# Patient Record
Sex: FEMALE | URBAN - METROPOLITAN AREA
[De-identification: names, ages, dates, MRNs, and addresses within clinical notes are randomized per-mention and may not be internally consistent; named-entity substitution may affect disease eponyms.]

---

## 2017-07-12 PROCEDURE — 00000346 ZZHCL STATISTIC REVIEW OUTSIDE SLIDES TC 88321: Performed by: OBSTETRICS & GYNECOLOGY

## 2017-07-13 LAB — COPATH REPORT: NORMAL

## 2017-08-10 ENCOUNTER — ONCOLOGY VISIT (OUTPATIENT)
Dept: ONCOLOGY | Facility: CLINIC | Age: 72
End: 2017-08-10
Attending: OBSTETRICS & GYNECOLOGY
Payer: MEDICARE

## 2017-08-10 VITALS
OXYGEN SATURATION: 96 % | WEIGHT: 270 LBS | DIASTOLIC BLOOD PRESSURE: 79 MMHG | HEART RATE: 77 BPM | SYSTOLIC BLOOD PRESSURE: 111 MMHG | HEIGHT: 65 IN | TEMPERATURE: 98.1 F | BODY MASS INDEX: 44.98 KG/M2

## 2017-08-10 DIAGNOSIS — B37.2 CANDIDIASIS OF SKIN: ICD-10-CM

## 2017-08-10 DIAGNOSIS — C54.1 PAPILLARY SEROUS ADENOCARCINOMA OF ENDOMETRIUM (H): Primary | ICD-10-CM

## 2017-08-10 PROCEDURE — 99203 OFFICE O/P NEW LOW 30 MIN: CPT | Mod: ZP | Performed by: OBSTETRICS & GYNECOLOGY

## 2017-08-10 PROCEDURE — 57100 BIOPSY VAGINAL MUCOSA SIMPLE: CPT | Mod: ZF | Performed by: OBSTETRICS & GYNECOLOGY

## 2017-08-10 PROCEDURE — 99213 OFFICE O/P EST LOW 20 MIN: CPT | Mod: ZF,25

## 2017-08-10 PROCEDURE — 88305 TISSUE EXAM BY PATHOLOGIST: CPT | Performed by: OBSTETRICS & GYNECOLOGY

## 2017-08-10 RX ORDER — WARFARIN SODIUM 5 MG/1
5 TABLET ORAL DAILY
COMMUNITY
Start: 2015-09-01

## 2017-08-10 RX ORDER — NYSTATIN 100000 U/G
CREAM TOPICAL 3 TIMES DAILY
Qty: 15 G | Refills: 1 | Status: SHIPPED | OUTPATIENT
Start: 2017-08-10 | End: 2017-08-24

## 2017-08-10 RX ORDER — OXYBUTYNIN CHLORIDE 10 MG/1
10 TABLET, EXTENDED RELEASE ORAL DAILY
COMMUNITY
Start: 2015-09-01

## 2017-08-10 RX ORDER — MONTELUKAST SODIUM 10 MG/1
10 TABLET ORAL AT BEDTIME
COMMUNITY

## 2017-08-10 RX ORDER — ZOLPIDEM TARTRATE 5 MG/1
5 TABLET ORAL
COMMUNITY
Start: 2015-09-01

## 2017-08-10 RX ORDER — ESCITALOPRAM OXALATE 20 MG/1
20 TABLET ORAL DAILY
COMMUNITY

## 2017-08-10 RX ORDER — DEXLANSOPRAZOLE 30 MG/1
60 CAPSULE, DELAYED RELEASE ORAL DAILY
COMMUNITY

## 2017-08-10 RX ORDER — METOPROLOL TARTRATE 25 MG/1
25 TABLET, FILM COATED ORAL 2 TIMES DAILY
COMMUNITY
Start: 2015-09-01

## 2017-08-10 RX ORDER — ATORVASTATIN CALCIUM 20 MG/1
20 TABLET, FILM COATED ORAL DAILY
COMMUNITY
Start: 2015-09-01

## 2017-08-10 RX ORDER — IBUPROFEN 800 MG/1
800 TABLET, FILM COATED ORAL 3 TIMES DAILY PRN
COMMUNITY
Start: 2015-09-01

## 2017-08-10 RX ORDER — ALENDRONATE SODIUM 70 MG/1
70 TABLET ORAL WEEKLY
COMMUNITY

## 2017-08-10 RX ORDER — TIZANIDINE HYDROCHLORIDE 2 MG/1
2 CAPSULE, GELATIN COATED ORAL EVERY 6 HOURS PRN
COMMUNITY
Start: 2015-10-26

## 2017-08-10 ASSESSMENT — PAIN SCALES - GENERAL: PAINLEVEL: NO PAIN (0)

## 2017-08-10 NOTE — LETTER
8/10/2017       RE: Yashira Son  [address]  [facility] [address]     Dear Colleague,    Thank you for referring your patient, Yashira Son, to the [facility]. Please see a copy of my visit note below.                            Consult Notes on Referred Patient    Date: 2017       Dr. Debra Wolfe, NP   [facility]  [address]  [place] [address]       RE: Yashira Son  : 1945  WAGNER: 8/10/2017    Dear Dr. Debra Wolfe:    I had the pleasure of seeing your patient Yashira Son here at the Gynecologic Cancer Clinic at the [facility] on 8/10/2017.  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  Given these findings she was subsequently sent to the Gynecologic Cancer Clinic for new patient consultation.     HPI:  Ms. Son is a 72 year old woman who originally presented with post-menopausal bleeding in 2016 with atypical glandular cell pap in 2015. Biopsy revealed adenocarcinoma and the patient proceeded with subsequent robotic hysterectomy, BSO and cancer staging on 12/18/15. No malignancy seen at time of procedure and peritoneal surfaces and omentum were clear. Final pathology report showed positive washings, a 2.1 cm noninvasive high-grade serous endometrial cancer, and benign polyps. + fibroids. DNA mismatch repair intact showing this is not due to hoyos. ER receptor was 66% positive and IA receptor was 38% positive --> stage IA high-grade serous endometrial cancer with positive washings. Patient underwent 6 cycles of chemotherapy, completed 16. Dose reduction of Taxol done during cycle #4 due to neuropathy of fingers and toes. CT scan was negative on 16. Other notable history includes: atrial fibrillation on coumadin, morbid obesity, adult-onset diabetes, asthma and hyperlipidemia.     12/18/15: [facility]  [facility]/BSO/bilateral PPALND/omental biopsy with Dr. Brunner for high grade serous endometrial cancer.    PATH REREAD:A. UTERUS, FALLOPIAN TUBES AND OVARIES, HYSTERECTOMY AND BILATERAL   SALPINGO-OOPHORECTOMY:   - Endometrial serous carcinoma   - Maximum tumor size 2.1cm   - Myometrial invasion absent   - Lower uterine segment, cervix and uterine serosa - uninvolved   - Lymphovascular invasion - absent   - Margins negative for tumor   - Ovary and fallopian tubes negative for carcinoma.   - Leiomyomas     B. LYMPH NODES, RIGHT PERIAORTIC, DISSECTION:   - Two lymph nodes negative for carcinoma (0/2)     C. LYMPH NODE, LEFT PERIAORTIC, DISSECTION:   - One lymph nodes negative for carcinoma (0/1)     D. LYMPH NODES, LEFT PELVIC, DISSECTION:   - Two lymph nodes negative for carcinoma (0/2)     E. LYMPH NODES, RIGHT PARA-AORTIC DISSECTION:   - Nine lymph nodes negative for carcinoma (0/9)     F. OMENTUM, OMENTECTOMY:   - Adipose tissue negative for carcinoma     COMMENT:   Tumor cells are focally positive for estrogen receptor (5%) and negative   for progesterone receptor.   DNA mismatch repair enzymes are intact. Immunostains performed in the   outside institution are reviewed in-house.     CT C/A/P at OSH (6/2016): negative for masses except stable left lung nodule    Today, she presents for transfer of care.  She has not been seen since 6/2016, she did not know she needed to be seen more frequently than annually.  She notes feeling well.  Denies vaginal bleeding.  Eating healthy diet, denies nausea and vomiting.  Has been trying to lose weight because she gained weight during chemotherapy.  She denies fevers, chills, chest pain, SOB, abdominal pain, diarrhea, constipation, urinary symptoms.  She does note ongoing grief around her cancer diagnosis as well several losses of family members around the same time including one of her daughters and two grandchildren.    Review of Systems:    Systemic           no unintentional  weight changes; no fever; no chills; no night sweats; no appetite changes  Skin           no rashes, or lesions  Eye           no irritation; no changes in vision  [place]-Laryngeal           no dysphagia; no hoarseness   Pulmonary    no cough; no shortness of breath  Cardiovascular    no chest pain; no palpitations  Gastrointestinal    no diarrhea; no constipation; no abdominal pain; no changes in bowel  habits; no blood in stool  Genitourinary   no urinary frequency; no urinary urgency; no dysuria; no pain; no abnormal vaginal discharge; no abnormal vaginal bleeding  Breast   no breast discharge; no breast changes; no breast pain  Musculoskeletal    no myalgias; no arthralgias; no back pain  Psychiatric           + grief, no depressed mood; no anxiety    Hematologic           no tender lymph nodes; no noticeable swellings or lumps   Endocrine    no hot flashes; no heat/cold intolerance         Neurological   no tremor; no numbness and tingling; no headaches; no difficulty  sleeping    I have reviewed and addressed the patient's review of symptoms for today's visit.     Past Medical History:    Past Medical History:   Diagnosis Date     Anxiety      Asthma      Atrial fibrillation (H)      Depression      Endometrial cancer (H)      Hyperlipidemia      RONNY (obstructive sleep apnea)      Prediabetes          Past Surgical History:    Past Surgical History:   Procedure Laterality Date     DAVINCI HYSTERECTOMY TOTAL, BILATERAL SALPINGO-OOPHORECTOMY, NODE DISSECTION, COMBINED       oophorectomy Right     XL, dermoid, in 1981     THYROIDECTOMY      subtotal         Health Maintenance:  Health Maintenance Due   Topic Date Due     TETANUS IMMUNIZATION (SYSTEM ASSIGNED)  07/23/1961     LIPID SCREEN Q5 YR FEMALE (SYSTEM ASSIGNED)  07/23/1988     MAMMO SCREEN Q2 YR (SYSTEM ASSIGNED)  07/23/1993     COLON CANCER SCREEN (SYSTEM ASSIGNED)  07/23/1993     ADVANCE DIRECTIVE PLANNING Q5 YRS  07/23/1998     FALL RISK ASSESSMENT   "07/23/2008     DEXA SCAN SCREENING (SYSTEM ASSIGNED)  07/23/2008     PNEUMOCOCCAL (1 of 2 - PCV13) 07/23/2008       Last Pap Smear: 2015              Result: abnormal: glandular cell pap   She has not had a history of abnormal Pap smears prior to this.     Last Mammogram: 2013              Result: normal      She has not had a history of abnormal mammograms.    Last Colonoscopy: 2013              Result: normal                      Current Medications:     has a current medication list which includes the following prescription(s): alendronate, dexlansoprazole, metformin, atorvastatin, fluticasone-salmeterol, ibuprofen, oxybutynin, metoprolol, warfarin, tizanidine hcl, escitalopram, zolpidem, montelukast, prochlorperazine maleate, cholecalciferol, and b complex-biotin-fa.       Allergies:    No Known Allergies       Social History:     Social History   Substance Use Topics     Smoking status: Former Smoker     Smokeless tobacco: Never Used      Comment: quite 40+ years ago      Alcohol use No      Comment: socially       History   Drug Use No           Family History:   No known family history, but most is unknown to her.     Family History   Problem Relation Age of Onset     Adopted: Yes         Physical Exam:     /79  Pulse 77  Temp 98.1  F (36.7  C) (Oral)  Ht 1.651 m (5' 5\")  Wt 122.5 kg (270 lb)  SpO2 96%  BMI 44.93 kg/m2  Body mass index is 44.93 kg/(m^2).    General Appearance: healthy and alert, no distress     HEENT:  no thyromegaly, no palpable nodules or masses        Cardiovascular: irregular rate and rhythm, no gallops, rubs or murmurs     Respiratory: lungs clear, no rales, rhonchi or wheezes, normal diaphragmatic excursion    Musculoskeletal: extremities non tender and without edema    Skin: no lesions or rashes     Neurological: normal gait, no gross defects     Psychiatric: appropriate mood and affect                               Hematological: normal cervical, supraclavicular and " inguinal lymph nodes     Gastrointestinal:       abdomen obese, soft, non-tender, non-distended, no organomegaly or masses, pannus with small erythematous area consistent with yeast approximately 4 x 2cm    Genitourinary: External genitalia and urethral meatus appears normal.  1.5 cm nodule consistent with lipoma noted on left gluteal area. Vagina is smooth without nodularity or masses.  Vaginal cuff appears normal and without lesions except for 0.5 cm fleshy appearing lesion, possible granulation tissue on cuff that was removed with biopsy forceps.  Bimanual exam reveal no masses, nodularity or fullness.  Recto-vaginal exam confirms these findings.      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 Recommend CT C/A/P.  Discussed recommendations for Q3 month visits for the first two years followed by Q6 months visit for the subsequent three years.  No concerning findings on exam except for vaginal cuff lesion, appearance consistent with polyp but will follow-up on pathology.       2.) Genetic risk factors were assessed and the patient does not meet the qualifications for a referral.      3.) Labs and/or tests ordered include:  CT C/A/P for surveillance      4.) Health maintenance issues addressed today include yearly mammograms and continued follow-up with PCP for management of medical co-morbidities.  Follow-up with counselor and PCP regarding mental health. Offered referral to clinic  per patient preference.     5.)  Pannus candidiasis: discussed hygiene to area, rx provided for nystatin cream.       Thank you for allowing us to participate in the care of your patient.         Sincerely,  Enriqueta Chairez MD    Department of Ob/Gyn and Women's Health  Division of Gynecologic Oncology  [facility]  [facility]  [phone]    I saw and evaluated the patient with the resident.  I edited and reviewed the above note.   The resident acted as a scribe for this visit.  Of a 35 minute appointment, more than 50% was spent in counseling the patient.    Addendum:  SPECIMEN(S):   Vaginal cuff biopsy     FINAL DIAGNOSIS:   VAGINA, VAGINAL CUFF BIOPSY:   - Granulation tissue   - Negative for malignancy   CC  Patient Care Team:  Debra Wolfe NP as PCP - General (Nurse Practitioner)  DEBRA WOLFE, Dali Fowler MD PGY-3 acted as a scribe for Dr. Chairez for today's visit.       No evidence of cancer on biopsy.  Please inform patient of the laboratory results.     Again, thank you for allowing me to participate in the care of your patient.      Sincerely,    Enriqueta Chairez MD

## 2017-08-10 NOTE — MR AVS SNAPSHOT
"              After Visit Summary   8/10/2017    Yashira Son    MRN: [id number]           Patient Information     Date Of Birth          1945        Visit Information        Provider Department      8/10/2017 10:00 AM Enriqueta Chairez MD [facility]        Today's Diagnoses     Papillary serous adenocarcinoma of endometrium (H)    -  1    Candidiasis of skin           Follow-ups after your visit        Your next 10 appointments already scheduled     Nov 14, 2017  4:00 PM CST   (Arrive by 3:45 PM)   Return Visit with NATHAN James CNP   [facility]    [address]  [address]  [facility] [address] [phone]              Who to contact     If you have questions or need follow up information about today's clinic visit or your schedule please contact [facility] directly at [phone].  Normal or non-critical lab and imaging results will be communicated to you by MyChart, letter or phone within 4 business days after the clinic has received the results. If you do not hear from us within 7 days, please contact the clinic through "[employer]"hart or phone. If you have a critical or abnormal lab result, we will notify you by phone as soon as possible.  Submit refill requests through [employer] or call your pharmacy and they will forward the refill request to us. Please allow 3 business days for your refill to be completed.          Additional Information About Your Visit        "[employer] Information     [employer] lets you send messages to your doctor, view your test results, renew your prescriptions, schedule appointments and more. To sign up, go to [email or web address] . Click on \"Log in\" on the left side of the screen, which will take you to the Welcome page. Then click on \"Sign up Now\" on the right side of the page.     You will be asked to enter the access code listed below, as well as some " "personal information. Please follow the directions to create your username and password.     Your access code is: [email or web address]  Expires: 2017  9:31 AM     Your access code will  in 90 days. If you need help or a new code, please call your [facility] clinic or [phone].        Care EveryWhere ID     This is your Care EveryWhere ID. This could be used by other organizations to access your [facility] medical records  [id number]        Your Vitals Were     Pulse Temperature Height Pulse Oximetry BMI (Body Mass Index)       77 98.1  F (36.7  C) (Oral) 1.651 m (5' 5\") 96% 44.93 kg/m2        Blood Pressure from Last 3 Encounters:   08/10/17 111/79    Weight from Last 3 Encounters:   08/10/17 122.5 kg (270 lb)              We Performed the Following     Biopsy of Vagina     Surgical pathology exam          Today's Medication Changes          These changes are accurate as of: 8/10/17 11:59 PM.  If you have any questions, ask your nurse or doctor.               Start taking these medicines.        Dose/Directions    iohexol 140 MG/ML Soln solution   Commonly known as:  OMNIPAQUE   Used for:  Papillary serous adenocarcinoma of endometrium (H)   Started by:  Enriqueta Chairez MD        Mix entire bottle (50ml) of contast with 600ml (20 ounces) of water and drink half 2 hrs prior to CT scan and half 1 hr prior to scan   Quantity:  140 mL   Refills:  0       nystatin cream   Commonly known as:  MYCOSTATIN   Used for:  Candidiasis of skin   Started by:  Enriqueta Chairez MD        Apply topically 3 times daily for 14 days   Quantity:  15 g   Refills:  1            Where to get your medicines      These medications were sent to [facility] [place], [place] - [address]  [address] [address]    Hours:  TRANSPLANT PHONE NUMBER [phone] Phone:  [phone]     iohexol 140 MG/ML Soln solution         These medications were sent to [facility] [address] AT [address] [place] [address] & [address] [address]     Phone:  [phone]     nystatin cream                Primary Care Provider Office Phone # Fax #    Debra Wolfe CRUIZTO [phone]        [facility] [address] [address]        Equal Access to Services     INGRID ESTRADA : Hadii aad ku hadasho Soomaali, waaxda luqadaha, qaybta kaalmada adeegyada, waxay idiin hayaan adeeg kharash la'cristaln ah. So [facility] [phone].    ATENCIÓN: Si habla espfan, tiene a pichardo disposición servicios gratuitos de asistencia lingüística. Ednaame al [phone].    We comply with applicable federal civil rights laws and [place] laws. We do not discriminate on the basis of race, color, national origin, age, disability sex, sexual orientation or gender identity.            Thank you!     Thank you for choosing [facility]  for your care. Our goal is always to provide you with excellent care. Hearing back from our patients is one way we can continue to improve our services. Please take a few minutes to complete the written survey that you may receive in the mail after your visit with us. Thank you!             Your Updated Medication List - Protect others around you: Learn how to safely use, store and throw away your medicines at www.disposemymeds.org.          This list is accurate as of: 8/10/17 11:59 PM.  Always use your most recent med list.                   Brand Name Dispense Instructions for use Diagnosis    ADVAIR DISKUS 250-50 MCG/DOSE diskus inhaler   Generic drug:  fluticasone-salmeterol      Inhale 1 puff into the lungs        alendronate 70 MG tablet    FOSAMAX     Take 70 mg by mouth once a week        atorvastatin 20 MG tablet    LIPITOR     Take 20 mg by mouth        B-50 COMPLEX PO           calcium-magnesium 500-250 MG Tabs per tablet    CALMAG     Take 1 tablet by mouth 2 times daily  (with meals)        cholecalciferol 5000 UNITS Tabs tablet    vitamin D3     Take 5,000 Units by mouth daily        dexlansoprazole 30 MG Cpdr CR capsule    DEXILANT     Take 60 mg by mouth        escitalopram 20 MG tablet    LEXAPRO     Take 20 mg by mouth        ibuprofen 800 MG tablet    ADVIL/MOTRIN     Take 800 mg by mouth        iohexol 140 MG/ML Soln solution    OMNIPAQUE    140 mL    Mix entire bottle (50ml) of contast with 600ml (20 ounces) of water and drink half 2 hrs prior to CT scan and half 1 hr prior to scan    Papillary serous adenocarcinoma of endometrium (H)       metFORMIN 500 MG tablet    GLUCOPHAGE     Take 500 mg by mouth 3 times daily (with meals)        metoprolol 25 MG tablet    LOPRESSOR     Take 25 mg by mouth        montelukast 10 MG tablet    SINGULAIR     Take 10 mg by mouth At Bedtime        nystatin cream    MYCOSTATIN    15 g    Apply topically 3 times daily for 14 days    Candidiasis of skin       oxybutynin 10 MG 24 hr tablet    DITROPAN-XL     10 mg        PROCHLORPERAZINE MALEATE PO      Take 10 mg by mouth every 6 hours as needed for nausea or vomiting        TiZANidine HCl 2 MG Caps      Take 2 mg by mouth        warfarin 5 MG tablet    COUMADIN     Take 5 mg by mouth 2.5 mg (1/2 tablet) M-W-F, whole tablet on other days.        zolpidem 5 MG tablet    AMBIEN     Take 5 mg by mouth

## 2017-08-10 NOTE — PROGRESS NOTES
Consult Notes on Referred Patient    Date: 2017       Dr. Debra Wolfe, NP  [address] [place] [address]       RE: Yashira Son  : 1945  WAGNER: 8/10/2017    Dear Dr. Debra Wolfe:    I had the pleasure of seeing your patient Yashira Son here at the Gynecologic Cancer Clinic at the [facility] on 8/10/2017.  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  Given these findings she was subsequently sent to the Gynecologic Cancer Clinic for new patient consultation.     HPI:  Ms. Son is a 72 year old woman who originally presented with post-menopausal bleeding in 2016 with atypical glandular cell pap in 2015. Biopsy revealed adenocarcinoma and the patient proceeded with subsequent robotic hysterectomy, BSO and cancer staging on 12/18/15. No malignancy seen at time of procedure and peritoneal surfaces and omentum were clear. Final pathology report showed positive washings, a 2.1 cm noninvasive high-grade serous endometrial cancer, and benign polyps. + fibroids. DNA mismatch repair intact showing this is not due to hoyos. ER receptor was 66% positive and RI receptor was 38% positive --> stage IA high-grade serous endometrial cancer with positive washings. Patient underwent 6 cycles of chemotherapy, completed 16. Dose reduction of Taxol done during cycle #4 due to neuropathy of fingers and toes. CT scan was negative on 16. Other notable history includes: atrial fibrillation on coumadin, morbid obesity, adult-onset diabetes, asthma and hyperlipidemia.     12/18/15: [facility] TLH/BSO/bilateral PPALND/omental biopsy with Dr. Brunner for high grade serous endometrial cancer.    PATH REREAD:A. UTERUS, FALLOPIAN TUBES AND OVARIES, HYSTERECTOMY AND BILATERAL   SALPINGO-OOPHORECTOMY:   - Endometrial serous carcinoma   - Maximum tumor  size 2.1cm   - Myometrial invasion absent   - Lower uterine segment, cervix and uterine serosa - uninvolved   - Lymphovascular invasion - absent   - Margins negative for tumor   - Ovary and fallopian tubes negative for carcinoma.   - Leiomyomas     B. LYMPH NODES, RIGHT PERIAORTIC, DISSECTION:   - Two lymph nodes negative for carcinoma (0/2)     C. LYMPH NODE, LEFT PERIAORTIC, DISSECTION:   - One lymph nodes negative for carcinoma (0/1)     D. LYMPH NODES, LEFT PELVIC, DISSECTION:   - Two lymph nodes negative for carcinoma (0/2)     E. LYMPH NODES, RIGHT PARA-AORTIC DISSECTION:   - Nine lymph nodes negative for carcinoma (0/9)     F. OMENTUM, OMENTECTOMY:   - Adipose tissue negative for carcinoma     COMMENT:   Tumor cells are focally positive for estrogen receptor (5%) and negative   for progesterone receptor.   DNA mismatch repair enzymes are intact. Immunostains performed in the   outside institution are reviewed in-house.     CT C/A/P at OSH (6/2016): negative for masses except stable left lung nodule    Today, she presents for transfer of care.  She has not been seen since 6/2016, she did not know she needed to be seen more frequently than annually.  She notes feeling well.  Denies vaginal bleeding.  Eating healthy diet, denies nausea and vomiting.  Has been trying to lose weight because she gained weight during chemotherapy.  She denies fevers, chills, chest pain, SOB, abdominal pain, diarrhea, constipation, urinary symptoms.  She does note ongoing grief around her cancer diagnosis as well several losses of family members around the same time including one of her daughters and two grandchildren.    Review of Systems:    Systemic           no unintentional weight changes; no fever; no chills; no night sweats; no appetite changes  Skin           no rashes, or lesions  Eye           no irritation; no changes in vision  [place]-Laryngeal           no dysphagia; no hoarseness   Pulmonary    no cough; no shortness of  breath  Cardiovascular    no chest pain; no palpitations  Gastrointestinal    no diarrhea; no constipation; no abdominal pain; no changes in bowel  habits; no blood in stool  Genitourinary   no urinary frequency; no urinary urgency; no dysuria; no pain; no abnormal vaginal discharge; no abnormal vaginal bleeding  Breast   no breast discharge; no breast changes; no breast pain  Musculoskeletal    no myalgias; no arthralgias; no back pain  Psychiatric           + grief, no depressed mood; no anxiety    Hematologic           no tender lymph nodes; no noticeable swellings or lumps   Endocrine    no hot flashes; no heat/cold intolerance         Neurological   no tremor; no numbness and tingling; no headaches; no difficulty  sleeping    I have reviewed and addressed the patient's review of symptoms for today's visit.     Past Medical History:    Past Medical History:   Diagnosis Date     Anxiety      Asthma      Atrial fibrillation (H)      Depression      Endometrial cancer (H)      Hyperlipidemia      RONNY (obstructive sleep apnea)      Prediabetes          Past Surgical History:    Past Surgical History:   Procedure Laterality Date     DAVINCI HYSTERECTOMY TOTAL, BILATERAL SALPINGO-OOPHORECTOMY, NODE DISSECTION, COMBINED       oophorectomy Right     XL, dermoid, in 1981     THYROIDECTOMY      subtotal         Health Maintenance:  Health Maintenance Due   Topic Date Due     TETANUS IMMUNIZATION (SYSTEM ASSIGNED)  07/23/1961     LIPID SCREEN Q5 YR FEMALE (SYSTEM ASSIGNED)  07/23/1988     MAMMO SCREEN Q2 YR (SYSTEM ASSIGNED)  07/23/1993     COLON CANCER SCREEN (SYSTEM ASSIGNED)  07/23/1993     ADVANCE DIRECTIVE PLANNING Q5 YRS  07/23/1998     FALL RISK ASSESSMENT  07/23/2008     DEXA SCAN SCREENING (SYSTEM ASSIGNED)  07/23/2008     PNEUMOCOCCAL (1 of 2 - PCV13) 07/23/2008       Last Pap Smear: 2015              Result: abnormal: glandular cell pap   She has not had a history of abnormal Pap smears prior to this.     Last  "Mammogram: 2013              Result: normal      She has not had a history of abnormal mammograms.    Last Colonoscopy: 2013              Result: normal                      Current Medications:     has a current medication list which includes the following prescription(s): alendronate, dexlansoprazole, metformin, atorvastatin, fluticasone-salmeterol, ibuprofen, oxybutynin, metoprolol, warfarin, tizanidine hcl, escitalopram, zolpidem, montelukast, prochlorperazine maleate, cholecalciferol, and b complex-biotin-fa.       Allergies:    No Known Allergies       Social History:     Social History   Substance Use Topics     Smoking status: Former Smoker     Smokeless tobacco: Never Used      Comment: quite 40+ years ago      Alcohol use No      Comment: socially       History   Drug Use No           Family History:   No known family history, but most is unknown to her.     Family History   Problem Relation Age of Onset     Adopted: Yes         Physical Exam:     /79  Pulse 77  Temp 98.1  F (36.7  C) (Oral)  Ht 1.651 m (5' 5\")  Wt 122.5 kg (270 lb)  SpO2 96%  BMI 44.93 kg/m2  Body mass index is 44.93 kg/(m^2).    General Appearance: healthy and alert, no distress     HEENT:  no thyromegaly, no palpable nodules or masses        Cardiovascular: irregular rate and rhythm, no gallops, rubs or murmurs     Respiratory: lungs clear, no rales, rhonchi or wheezes, normal diaphragmatic excursion    Musculoskeletal: extremities non tender and without edema    Skin: no lesions or rashes     Neurological: normal gait, no gross defects     Psychiatric: appropriate mood and affect                               Hematological: normal cervical, supraclavicular and inguinal lymph nodes     Gastrointestinal:       abdomen obese, soft, non-tender, non-distended, no organomegaly or masses, pannus with small erythematous area consistent with yeast approximately 4 x 2cm    Genitourinary: External genitalia and urethral meatus appears " normal.  1.5 cm nodule consistent with lipoma noted on left gluteal area. Vagina is smooth without nodularity or masses.  Vaginal cuff appears normal and without lesions except for 0.5 cm fleshy appearing lesion, possible granulation tissue on cuff that was removed with biopsy forceps.  Bimanual exam reveal no masses, nodularity or fullness.  Recto-vaginal exam confirms these findings.      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 Recommend CT C/A/P.  Discussed recommendations for Q3 month visits for the first two years followed by Q6 months visit for the subsequent three years.  No concerning findings on exam except for vaginal cuff lesion, appearance consistent with polyp but will follow-up on pathology.       2.) Genetic risk factors were assessed and the patient does not meet the qualifications for a referral.      3.) Labs and/or tests ordered include:  CT C/A/P for surveillance      4.) Health maintenance issues addressed today include yearly mammograms and continued follow-up with PCP for management of medical co-morbidities.  Follow-up with counselor and PCP regarding mental health. Offered referral to clinic  per patient preference.     5.)  Pannus candidiasis: discussed hygiene to area, rx provided for nystatin cream.       Thank you for allowing us to participate in the care of your patient.         Sincerely,  Enriqueta Chairez MD    Department of Ob/Gyn and Women's Health  Division of Gynecologic Oncology  [facility]  [phone]    I saw and evaluated the patient with the resident.  I edited and reviewed the above note.   The resident acted as a scribe for this visit.  Of a 35 minute appointment, more than 50% was spent in counseling the  patient.    Addendum:  SPECIMEN(S):   Vaginal cuff biopsy     FINAL DIAGNOSIS:   VAGINA, VAGINAL CUFF BIOPSY:   - Granulation tissue   - Negative for malignancy   CC  Patient Care Team:  Debra Wolfe NP as PCP - General (Nurse Practitioner)  DEBRA WOLFE Cara Clure, MD PGY-3 acted as a scribe for Dr. Chairez for today's visit.

## 2017-08-10 NOTE — NURSING NOTE
"Oncology Rooming Note    August 10, 2017 11:02 AM   Yashira Son is a 72 year old female who presents for:    Chief Complaint   Patient presents with     Oncology Clinic Visit     New: Endometrial CA, 1 year follow up     Initial Vitals: /79  Pulse 77  Temp 98.1  F (36.7  C) (Oral)  Ht 1.651 m (5' 5\")  Wt 122.5 kg (270 lb)  SpO2 96%  BMI 44.93 kg/m2 Estimated body mass index is 44.93 kg/(m^2) as calculated from the following:    Height as of this encounter: 1.651 m (5' 5\").    Weight as of this encounter: 122.5 kg (270 lb). Body surface area is 2.37 meters squared.  No Pain (0) Comment: Data Unavailable   No LMP recorded. Patient has had a hysterectomy.  Allergies reviewed: Yes  Medications reviewed: Yes    Medications: Medication refills not needed today.  Pharmacy name entered into [employer]: [facility] [address] [place] [address] AT [address] [place] [address]    Clinical concerns: Pt here for recurrence of Endometrial CA  Dr Chairez was notified.    10 minutes for nursing intake (face to face time)     Jessie Smith CMA              "

## 2017-08-11 LAB — COPATH REPORT: NORMAL

## 2017-08-14 ENCOUNTER — TELEPHONE (OUTPATIENT)
Dept: ONCOLOGY | Facility: CLINIC | Age: 72
End: 2017-08-14

## 2017-08-14 NOTE — TELEPHONE ENCOUNTER
Notes Recorded by Wing Chairez MD on 8/13/2017 at 9:33 PM  No evidence of cancer on biopsy.  Please inform patient of the laboratory results.          4d ago       Copath Report Patient Name: NATASHA SALCEDO   MR#: [id number]   Specimen #: [id number]   Collected: 8/10/2017   Received: 8/10/2017   Reported: 8/11/2017 10:21   Ordering Phy(s): WING CHAIREZ     For improved result formatting, select 'View Enhanced Report Format'   under Linked Documents section.     SPECIMEN(S):   Vaginal cuff biopsy     FINAL DIAGNOSIS:   VAGINA, VAGINAL CUFF BIOPSY:   - Granulation tissue   - Negative for malignancy                  Left message on voice mail

## 2017-09-15 ENCOUNTER — TELEPHONE (OUTPATIENT)
Dept: ONCOLOGY | Facility: CLINIC | Age: 72
End: 2017-09-15

## 2017-11-14 PROBLEM — C54.1 ENDOMETRIAL CANCER (H): Status: ACTIVE | Noted: 2017-11-14

## 2017-11-16 ENCOUNTER — ONCOLOGY VISIT (OUTPATIENT)
Dept: ONCOLOGY | Facility: CLINIC | Age: 72
End: 2017-11-16
Attending: NURSE PRACTITIONER
Payer: MEDICARE

## 2017-11-16 VITALS
BODY MASS INDEX: 45.75 KG/M2 | OXYGEN SATURATION: 97 % | SYSTOLIC BLOOD PRESSURE: 115 MMHG | RESPIRATION RATE: 17 BRPM | HEIGHT: 65 IN | DIASTOLIC BLOOD PRESSURE: 85 MMHG | WEIGHT: 274.6 LBS | TEMPERATURE: 98.2 F | HEART RATE: 82 BPM

## 2017-11-16 DIAGNOSIS — C54.1 ENDOMETRIAL CANCER (H): Primary | ICD-10-CM

## 2017-11-16 PROCEDURE — 99212 OFFICE O/P EST SF 10 MIN: CPT | Mod: ZF

## 2017-11-16 PROCEDURE — 99213 OFFICE O/P EST LOW 20 MIN: CPT | Mod: ZP | Performed by: NURSE PRACTITIONER

## 2017-11-16 ASSESSMENT — PAIN SCALES - GENERAL: PAINLEVEL: NO PAIN (0)

## 2017-11-16 NOTE — MR AVS SNAPSHOT
"              After Visit Summary   11/16/2017    Yashira Son    MRN: [id number]           Patient Information     Date Of Birth          1945        Visit Information        Provider Department      11/16/2017 10:00 AM Adrianna Hendricks APRN CNP [facility]        Today's Diagnoses     Endometrial cancer (H)    -  1       Follow-ups after your visit        Follow-up notes from your care team     Return in about 3 months (around 2/16/2018).      Your next 10 appointments already scheduled     Feb 15, 2018 10:30 AM CST   (Arrive by 10:15 AM)   Return Visit with NATHAN James CNP   [facility] [place]    [address]  [address] Floor  [facility] [address] [phone]              Who to contact     If you have questions or need follow up information about today's clinic visit or your schedule please contact [facility] directly at [phone].  Normal or non-critical lab and imaging results will be communicated to you by Navagishart, letter or phone within 4 business days after the clinic has received the results. If you do not hear from us within 7 days, please contact the clinic through EXFOt or phone. If you have a critical or abnormal lab result, we will notify you by phone as soon as possible.  Submit refill requests through [employer] or call your pharmacy and they will forward the refill request to us. Please allow 3 business days for your refill to be completed.          Additional Information About Your Visit        [employer] Information     [employer] lets you send messages to your doctor, view your test results, renew your prescriptions, schedule appointments and more. To sign up, go to [email or web address] . Click on \"Log in\" on the left side of the screen, which will take you to the Welcome page. Then click on \"Sign up Now\" on the right side of the page.     You will be asked to enter " "the access code listed below, as well as some personal information. Please follow the directions to create your username and password.     Your access code is: [email or web address]  Expires: 2018  5:30 AM     Your access code will  in 90 days. If you need help or a new code, please call your [facility] clinic or [phone].        Care EveryWhere ID     This is your Care EveryWhere ID. This could be used by other organizations to access your [facility] medical records  [id number]        Your Vitals Were     Pulse Temperature Respirations Height Pulse Oximetry Breastfeeding?    82 98.2  F (36.8  C) (Oral) 17 1.651 m (5' 5\") 97% No    BMI (Body Mass Index)                   45.7 kg/m2            Blood Pressure from Last 3 Encounters:   17 115/85   08/10/17 111/79    Weight from Last 3 Encounters:   17 124.6 kg (274 lb 9.6 oz)   08/10/17 122.5 kg (270 lb)              Today, you had the following     No orders found for display       Primary Care Provider Office Phone # Fax #    Debra Wolfe, [place] [phone]        [facility] [address] [address]        Equal Access to Services     INGRID ESTRADA : Hadii domitila ku hadasho Soomaali, waaxda luqadaha, qaybta kaalmada adeegyada, dannielle bell. So [facility] [phone].    ATENCIÓN: Si habla español, tiene a pichardo disposición servicios gratuitos de asistencia lingüística. Llame al [phone].    We comply with applicable federal civil rights laws and [place] laws. We do not discriminate on the basis of race, color, national origin, age, disability, sex, sexual orientation, or gender identity.            Thank you!     Thank you for choosing [facility]  for your care. Our goal is always to provide you with excellent care. Hearing back from our patients is one way we can continue to improve our services. Please take a few minutes to complete the written survey that you may " receive in the mail after your visit with us. Thank you!             Your Updated Medication List - Protect others around you: Learn how to safely use, store and throw away your medicines at www.disposemymeds.org.          This list is accurate as of: 11/16/17 10:18 AM.  Always use your most recent med list.                   Brand Name Dispense Instructions for use Diagnosis    ADVAIR DISKUS 250-50 MCG/DOSE diskus inhaler   Generic drug:  fluticasone-salmeterol      Inhale 1 puff into the lungs        alendronate 70 MG tablet    FOSAMAX     Take 70 mg by mouth once a week        atorvastatin 20 MG tablet    LIPITOR     Take 20 mg by mouth        B-50 COMPLEX PO           calcium-magnesium 500-250 MG Tabs per tablet    CALMAG     Take 1 tablet by mouth 2 times daily (with meals)        cholecalciferol 5000 UNITS Tabs tablet    vitamin D3     Take 5,000 Units by mouth daily        dexlansoprazole 30 MG Cpdr CR capsule    DEXILANT     Take 60 mg by mouth        escitalopram 20 MG tablet    LEXAPRO     Take 20 mg by mouth        ibuprofen 800 MG tablet    ADVIL/MOTRIN     Take 800 mg by mouth        iohexol 140 MG/ML Soln solution    OMNIPAQUE    140 mL    Mix entire bottle (50ml) of contast with 600ml (20 ounces) of water and drink half 2 hrs prior to CT scan and half 1 hr prior to scan    Papillary serous adenocarcinoma of endometrium (H)       metFORMIN 500 MG tablet    GLUCOPHAGE     Take 500 mg by mouth 3 times daily (with meals)        metoprolol 25 MG tablet    LOPRESSOR     Take 25 mg by mouth        montelukast 10 MG tablet    SINGULAIR     Take 10 mg by mouth At Bedtime        oxybutynin 10 MG 24 hr tablet    DITROPAN-XL     10 mg        PROCHLORPERAZINE MALEATE PO      Take 10 mg by mouth every 6 hours as needed for nausea or vomiting        TiZANidine HCl 2 MG Caps      Take 2 mg by mouth        warfarin 5 MG tablet    COUMADIN     Take 5 mg by mouth 2.5 mg (1/2 tablet) M-W-F, whole tablet on other days.         zolpidem 5 MG tablet    AMBIEN     Take 5 mg by mouth

## 2017-11-16 NOTE — LETTER
2017     RE: Yashira Son  [address]  [address]  [facility] [address]     Dear Colleague,    Thank you for referring your patient, Yashira Son, to the [facility]. Please see a copy of my visit note below.                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   She is here today for a surveillance visit.     HPI:  Ms. Son originally presented with post-menopausal bleeding in 2016 with atypical glandular cell pap in 2015. Biopsy revealed adenocarcinoma and the patient proceeded with subsequent robotic hysterectomy, BSO and cancer staging on 12/18/15. No malignancy seen at time of procedure and peritoneal surfaces and omentum were clear. Final pathology report showed positive washings, a 2.1 cm noninvasive high-grade serous endometrial cancer, and benign polyps. + fibroids. DNA mismatch repair intact showing this is not due to hoyos. ER receptor was 66% positive and MA receptor was 38% positive --> stage IA high-grade serous endometrial cancer with positive washings. Patient underwent 6 cycles of chemotherapy, completed 16. Dose reduction of Taxol done during cycle #4 due to neuropathy of fingers and toes. CT scan was negative on 16. Other notable history includes: atrial fibrillation on coumadin, morbid obesity, adult-onset diabetes, asthma and hyperlipidemia.     12/18/15: DaVinci TLH/BSO/bilateral PPALND/omental biopsy with Dr. Brunner for high grade serous endometrial cancer.     PATH REREAD:A. UTERUS, FALLOPIAN TUBES AND OVARIES, HYSTERECTOMY AND BILATERAL   SALPINGO-OOPHORECTOMY:   - Endometrial serous carcinoma   - Maximum tumor size 2.1cm   - Myometrial invasion absent   - Lower uterine segment, cervix and uterine serosa - uninvolved   - Lymphovascular invasion - absent    - Margins negative for tumor   - Ovary and fallopian tubes negative for carcinoma.   - Leiomyomas     B. LYMPH NODES, RIGHT PERIAORTIC, DISSECTION:   - Two lymph nodes negative for carcinoma (0/2)     C. LYMPH NODE, LEFT PERIAORTIC, DISSECTION:   - One lymph nodes negative for carcinoma (0/1)     D. LYMPH NODES, LEFT PELVIC, DISSECTION:   - Two lymph nodes negative for carcinoma (0/2)     E. LYMPH NODES, RIGHT PARA-AORTIC DISSECTION:   - Nine lymph nodes negative for carcinoma (0/9)     F. OMENTUM, OMENTECTOMY:   - Adipose tissue negative for carcinoma     COMMENT:   Tumor cells are focally positive for estrogen receptor (5%) and negative for progesterone receptor. DNA mismatch repair enzymes are intact. Immunostains performed in the outside institution are reviewed in-house.      CT C/A/P at OSH (6/2016): negative for masses except stable left lung nodule    She was lost to follow up until 8/2017.      Today she comes to clinic feeling generally well. She denies any vaginal bleeding, no changes in her bowel or bladder habits, no nausea/emesis, no lower extremity edema, and no difficulties eating or sleeping. She denies any abdominal discomfort/bloating, no fevers or chills, and no chest pain or shortness of breath. She does have some residual neuropathy in the top portion of her feet but it does not bother her too much. She takes her mental health medication, denies SI, and states she previously was seen by a therapist for situational depression (has lost a child and 2 grandchildren). She is not sexually active and does have some dryness; she does have coconut oil at home to use. She is current with her health maintenance.        Review of Systems:    Systemic           no weight changes; no fever; no chills; no night sweats; no appetite changes  Skin           no rashes, or lesions  Eye           no irritation; no changes in vision  [place]-Laryngeal           no dysphagia; no hoarseness   Pulmonary    no cough; no  shortness of breath  Cardiovascular    no chest pain; no palpitations  Gastrointestinal    no diarrhea; no constipation; no abdominal pain; no changes in bowel habits; no blood in stool  Genitourinary   no urinary frequency; no urinary urgency; no dysuria; no pain; no abnormal vaginal discharge; no abnormal vaginal bleeding  Breast    no breast discharge; no breast changes; no breast pain  Musculoskeletal    no myalgias; no arthralgias; + back pain  Psychiatric           + depressed mood; + anxiety    Hematologic               no tender lymph nodes; no noticeable swellings or lumps   Endocrine    no hot flashes; no heat/cold intolerance         Neurological   no tremor; + numbness and tingling; no headaches; no difficulty sleeping      Past Medical History:    Past Medical History:   Diagnosis Date     Anxiety      Asthma      Atrial fibrillation (H)      Depression      Endometrial cancer (H)      Hyperlipidemia      RONNY (obstructive sleep apnea)      Prediabetes          Past Surgical History:    Past Surgical History:   Procedure Laterality Date     DAVINCI HYSTERECTOMY TOTAL, BILATERAL SALPINGO-OOPHORECTOMY, NODE DISSECTION, COMBINED       oophorectomy Right     XL, dermoid, in 1981     THYROIDECTOMY      subtotal         Health Maintenance Due   Topic Date Due     TETANUS IMMUNIZATION (SYSTEM ASSIGNED)  07/23/1961     LIPID SCREEN Q5 YR FEMALE (SYSTEM ASSIGNED)  07/23/1988     MAMMO SCREEN Q2 YR (SYSTEM ASSIGNED)  07/23/1993     COLON CANCER SCREEN (SYSTEM ASSIGNED)  07/23/1993     ADVANCE DIRECTIVE PLANNING Q5 YRS  07/23/1998     FALL RISK ASSESSMENT  07/23/2008     DEXA SCAN SCREENING (SYSTEM ASSIGNED)  07/23/2008     PNEUMOCOCCAL (1 of 2 - PCV13) 07/23/2008     INFLUENZA VACCINE (SYSTEM ASSIGNED)  09/01/2017       Current Medications:     Current Outpatient Prescriptions   Medication Sig Dispense Refill     alendronate (FOSAMAX) 70 MG tablet Take 70 mg by mouth once a week       dexlansoprazole (DEXILANT) 30  "MG CPDR CR capsule Take 60 mg by mouth       metFORMIN (GLUCOPHAGE) 500 MG tablet Take 500 mg by mouth 3 times daily (with meals)       atorvastatin (LIPITOR) 20 MG tablet Take 20 mg by mouth       fluticasone-salmeterol (ADVAIR DISKUS) 250-50 MCG/DOSE diskus inhaler Inhale 1 puff into the lungs       ibuprofen (ADVIL/MOTRIN) 800 MG tablet Take 800 mg by mouth       oxybutynin (DITROPAN-XL) 10 MG 24 hr tablet 10 mg       metoprolol (LOPRESSOR) 25 MG tablet Take 25 mg by mouth       warfarin (COUMADIN) 5 MG tablet Take 5 mg by mouth 2.5 mg (1/2 tablet) M-W-F, whole tablet on other days.       TiZANidine HCl 2 MG CAPS Take 2 mg by mouth       escitalopram (LEXAPRO) 20 MG tablet Take 20 mg by mouth       zolpidem (AMBIEN) 5 MG tablet Take 5 mg by mouth       montelukast (SINGULAIR) 10 MG tablet Take 10 mg by mouth At Bedtime       PROCHLORPERAZINE MALEATE PO Take 10 mg by mouth every 6 hours as needed for nausea or vomiting       cholecalciferol (VITAMIN D3) 5000 UNITS TABS tablet Take 5,000 Units by mouth daily       B Complex-Biotin-FA (B-50 COMPLEX PO)        calcium-magnesium (CALMAG) 500-250 MG TABS per tablet Take 1 tablet by mouth 2 times daily (with meals)       iohexol (OMNIPAQUE) 140 MG/ML SOLN solution Mix entire bottle (50ml) of contast with 600ml (20 ounces) of water and drink half 2 hrs prior to CT scan and half 1 hr prior to scan (Patient not taking: Reported on 11/16/2017) 140 mL 0         Allergies:      No Known Allergies     Social History:     Social History   Substance Use Topics     Smoking status: Former Smoker     Smokeless tobacco: Never Used      Comment: quite 40+ years ago      Alcohol use No      Comment: socially       History   Drug Use No         Family History:       Family History   Problem Relation Age of Onset     Adopted: Yes         Physical Exam:     /85  Pulse 82  Temp 98.2  F (36.8  C) (Oral)  Resp 17  Ht 1.651 m (5' 5\")  Wt 124.6 kg (274 lb 9.6 oz)  SpO2 97%  " Breastfeeding? No  BMI 45.7 kg/m2  Body mass index is 45.7 kg/(m^2).    General Appearance: healthy and alert, no distress     HEENT: no thyromegaly, no palpable nodules or masses        Cardiovascular: regular rate and rhythm, no gallops, rubs or murmurs     Respiratory: lungs clear, no rales, rhonchi or wheezes, normal diaphragmatic excursion    Musculoskeletal: extremities non tender and without edema    Skin: no lesions or rashes     Neurological: normal gait, no gross defects     Psychiatric: appropriate mood and affect                               Hematological: normal cervical, supraclavicular and inguinal lymph nodes     Gastrointestinal:       abdomen soft, obese, non-tender, non-distended, no organomegaly or masses    Genitourinary: External genitalia and urethral meatus appears normal.  Vagina is smooth without nodularity or masses.  Cervix surgically absent.  Bimanual exam reveal no masses, nodularity or fullness--exam limited secondary to body habitus.  Recto-vaginal exam confirms these findings.      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   She is here today for a surveillance visit.       20 minutes were spent with this patient, over 50% of that time was spent in symptom management, treatment planning and in counseling and coordination of care.      Plan:     1.)        Patient to RTC in 3 months for her next surveillance visit. Reviewed surveillance of every 3 months until 5/2018 and then every 6 months x 3 additional years. Reviewed recommendations from [facility] not to perform surveillance pap smears in women diagnosed with endometrial cancer as this does not improve detection of local recurrence. Reviewed signs and symptoms for when she should contact the clinic or seek additional care. Patient to contact the clinic with any questions or concerns in the interim.     2.) Genetic risk factors were assessed and her MMR  proteins were intact.      3.) Labs and/or tests ordered include:  None.      4.) Health maintenance issues addressed today include annual health maintenance and non-gynecologic issues with PCP, including HTN.     NATHAN Sneed, NP-BC, ANP-BC  Women's Health Nurse Practitioner  Adult Nurse Pracitioner  Gynecologic Oncology    CC  Patient Care Team:  Debra Wolfe NP as PCP - General (Nurse Practitioner)  SELF, REFERRED

## 2017-11-16 NOTE — NURSING NOTE
"Oncology Rooming Note    November 16, 2017 9:50 AM   Yashira Son is a 72 year old female who presents for:    Chief Complaint   Patient presents with     Oncology Clinic Visit     return patient visit for 3 month follow up related to Endometrial cancer (H)     Initial Vitals: BP (!) 125/94  Pulse 101  Temp 98.2  F (36.8  C) (Oral)  Resp 17  Ht 1.651 m (5' 5\")  Wt 124.6 kg (274 lb 9.6 oz)  SpO2 94%  Breastfeeding? No  BMI 45.7 kg/m2 Estimated body mass index is 45.7 kg/(m^2) as calculated from the following:    Height as of this encounter: 1.651 m (5' 5\").    Weight as of this encounter: 124.6 kg (274 lb 9.6 oz). Body surface area is 2.39 meters squared.  No Pain (0) Comment: Data Unavailable   No LMP recorded. Patient has had a hysterectomy.  Allergies reviewed: Yes  Medications reviewed: Yes    Medications: Medication refills not needed today.  Pharmacy name entered into [facility]: [id number] - [address] AT [address] [place] [address]    Clinical concerns: no concerns - did a blood pressure recheck (115/85) juana was notified.    7 minutes for nursing intake (face to face time)     Kika Bergeron CMA              "

## 2017-11-16 NOTE — PROGRESS NOTES
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   She is here today for a surveillance visit.     HPI:  Ms. Son originally presented with post-menopausal bleeding in 2016 with atypical glandular cell pap in 2015. Biopsy revealed adenocarcinoma and the patient proceeded with subsequent robotic hysterectomy, BSO and cancer staging on 12/18/15. No malignancy seen at time of procedure and peritoneal surfaces and omentum were clear. Final pathology report showed positive washings, a 2.1 cm noninvasive high-grade serous endometrial cancer, and benign polyps. + fibroids. DNA mismatch repair intact showing this is not due to hoyos. ER receptor was 66% positive and MN receptor was 38% positive --> stage IA high-grade serous endometrial cancer with positive washings. Patient underwent 6 cycles of chemotherapy, completed 16. Dose reduction of Taxol done during cycle #4 due to neuropathy of fingers and toes. CT scan was negative on 16. Other notable history includes: atrial fibrillation on coumadin, morbid obesity, adult-onset diabetes, asthma and hyperlipidemia.     12/18/15: DaVinci TLH/BSO/bilateral PPALND/omental biopsy with Dr. Brunner for high grade serous endometrial cancer.     PATH REREAD:A. UTERUS, FALLOPIAN TUBES AND OVARIES, HYSTERECTOMY AND BILATERAL   SALPINGO-OOPHORECTOMY:   - Endometrial serous carcinoma   - Maximum tumor size 2.1cm   - Myometrial invasion absent   - Lower uterine segment, cervix and uterine serosa - uninvolved   - Lymphovascular invasion - absent   - Margins negative for tumor   - Ovary and fallopian tubes negative for carcinoma.   - Leiomyomas     B. LYMPH NODES, RIGHT PERIAORTIC, DISSECTION:   - Two lymph nodes negative for carcinoma (0/2)     C. LYMPH NODE, LEFT PERIAORTIC,  DISSECTION:   - One lymph nodes negative for carcinoma (0/1)     D. LYMPH NODES, LEFT PELVIC, DISSECTION:   - Two lymph nodes negative for carcinoma (0/2)     E. LYMPH NODES, RIGHT PARA-AORTIC DISSECTION:   - Nine lymph nodes negative for carcinoma (0/9)     F. OMENTUM, OMENTECTOMY:   - Adipose tissue negative for carcinoma     COMMENT:   Tumor cells are focally positive for estrogen receptor (5%) and negative for progesterone receptor. DNA mismatch repair enzymes are intact. Immunostains performed in the outside institution are reviewed in-house.      CT C/A/P at OSH (6/2016): negative for masses except stable left lung nodule    She was lost to follow up until 8/2017.      Today she comes to clinic feeling generally well. She denies any vaginal bleeding, no changes in her bowel or bladder habits, no nausea/emesis, no lower extremity edema, and no difficulties eating or sleeping. She denies any abdominal discomfort/bloating, no fevers or chills, and no chest pain or shortness of breath. She does have some residual neuropathy in the top portion of her feet but it does not bother her too much. She takes her mental health medication, denies SI, and states she previously was seen by a therapist for situational depression (has lost a child and 2 grandchildren). She is not sexually active and does have some dryness; she does have coconut oil at home to use. She is current with her health maintenance.        Review of Systems:    Systemic           no weight changes; no fever; no chills; no night sweats; no appetite changes  Skin           no rashes, or lesions  Eye           no irritation; no changes in vision  [place]-Laryngeal           no dysphagia; no hoarseness   Pulmonary    no cough; no shortness of breath  Cardiovascular    no chest pain; no palpitations  Gastrointestinal    no diarrhea; no constipation; no abdominal pain; no changes in bowel habits; no blood in stool  Genitourinary   no urinary frequency; no urinary  urgency; no dysuria; no pain; no abnormal vaginal discharge; no abnormal vaginal bleeding  Breast    no breast discharge; no breast changes; no breast pain  Musculoskeletal    no myalgias; no arthralgias; + back pain  Psychiatric           + depressed mood; + anxiety    Hematologic               no tender lymph nodes; no noticeable swellings or lumps   Endocrine    no hot flashes; no heat/cold intolerance         Neurological   no tremor; + numbness and tingling; no headaches; no difficulty sleeping      Past Medical History:    Past Medical History:   Diagnosis Date     Anxiety      Asthma      Atrial fibrillation (H)      Depression      Endometrial cancer (H)      Hyperlipidemia      RONNY (obstructive sleep apnea)      Prediabetes          Past Surgical History:    Past Surgical History:   Procedure Laterality Date     DAVINCI HYSTERECTOMY TOTAL, BILATERAL SALPINGO-OOPHORECTOMY, NODE DISSECTION, COMBINED       oophorectomy Right     XL, dermoid, in 1981     THYROIDECTOMY      subtotal         Health Maintenance Due   Topic Date Due     TETANUS IMMUNIZATION (SYSTEM ASSIGNED)  07/23/1961     LIPID SCREEN Q5 YR FEMALE (SYSTEM ASSIGNED)  07/23/1988     MAMMO SCREEN Q2 YR (SYSTEM ASSIGNED)  07/23/1993     COLON CANCER SCREEN (SYSTEM ASSIGNED)  07/23/1993     ADVANCE DIRECTIVE PLANNING Q5 YRS  07/23/1998     FALL RISK ASSESSMENT  07/23/2008     DEXA SCAN SCREENING (SYSTEM ASSIGNED)  07/23/2008     PNEUMOCOCCAL (1 of 2 - PCV13) 07/23/2008     INFLUENZA VACCINE (SYSTEM ASSIGNED)  09/01/2017       Current Medications:     Current Outpatient Prescriptions   Medication Sig Dispense Refill     alendronate (FOSAMAX) 70 MG tablet Take 70 mg by mouth once a week       dexlansoprazole (DEXILANT) 30 MG CPDR CR capsule Take 60 mg by mouth       metFORMIN (GLUCOPHAGE) 500 MG tablet Take 500 mg by mouth 3 times daily (with meals)       atorvastatin (LIPITOR) 20 MG tablet Take 20 mg by mouth       fluticasone-salmeterol (ADVAIR  "DISKUS) 250-50 MCG/DOSE diskus inhaler Inhale 1 puff into the lungs       ibuprofen (ADVIL/MOTRIN) 800 MG tablet Take 800 mg by mouth       oxybutynin (DITROPAN-XL) 10 MG 24 hr tablet 10 mg       metoprolol (LOPRESSOR) 25 MG tablet Take 25 mg by mouth       warfarin (COUMADIN) 5 MG tablet Take 5 mg by mouth 2.5 mg (1/2 tablet) M-W-F, whole tablet on other days.       TiZANidine HCl 2 MG CAPS Take 2 mg by mouth       escitalopram (LEXAPRO) 20 MG tablet Take 20 mg by mouth       zolpidem (AMBIEN) 5 MG tablet Take 5 mg by mouth       montelukast (SINGULAIR) 10 MG tablet Take 10 mg by mouth At Bedtime       PROCHLORPERAZINE MALEATE PO Take 10 mg by mouth every 6 hours as needed for nausea or vomiting       cholecalciferol (VITAMIN D3) 5000 UNITS TABS tablet Take 5,000 Units by mouth daily       B Complex-Biotin-FA (B-50 COMPLEX PO)        calcium-magnesium (CALMAG) 500-250 MG TABS per tablet Take 1 tablet by mouth 2 times daily (with meals)       iohexol (OMNIPAQUE) 140 MG/ML SOLN solution Mix entire bottle (50ml) of contast with 600ml (20 ounces) of water and drink half 2 hrs prior to CT scan and half 1 hr prior to scan (Patient not taking: Reported on 11/16/2017) 140 mL 0         Allergies:      No Known Allergies     Social History:     Social History   Substance Use Topics     Smoking status: Former Smoker     Smokeless tobacco: Never Used      Comment: quite 40+ years ago      Alcohol use No      Comment: socially       History   Drug Use No         Family History:       Family History   Problem Relation Age of Onset     Adopted: Yes         Physical Exam:     /85  Pulse 82  Temp 98.2  F (36.8  C) (Oral)  Resp 17  Ht 1.651 m (5' 5\")  Wt 124.6 kg (274 lb 9.6 oz)  SpO2 97%  Breastfeeding? No  BMI 45.7 kg/m2  Body mass index is 45.7 kg/(m^2).    General Appearance: healthy and alert, no distress     HEENT: no thyromegaly, no palpable nodules or masses        Cardiovascular: regular rate and rhythm, no " gallops, rubs or murmurs     Respiratory: lungs clear, no rales, rhonchi or wheezes, normal diaphragmatic excursion    Musculoskeletal: extremities non tender and without edema    Skin: no lesions or rashes     Neurological: normal gait, no gross defects     Psychiatric: appropriate mood and affect                               Hematological: normal cervical, supraclavicular and inguinal lymph nodes     Gastrointestinal:       abdomen soft, obese, non-tender, non-distended, no organomegaly or masses    Genitourinary: External genitalia and urethral meatus appears normal.  Vagina is smooth without nodularity or masses.  Cervix surgically absent.  Bimanual exam reveal no masses, nodularity or fullness--exam limited secondary to body habitus.  Recto-vaginal exam confirms these findings.      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   She is here today for a surveillance visit.       20 minutes were spent with this patient, over 50% of that time was spent in symptom management, treatment planning and in counseling and coordination of care.      Plan:     1.)        Patient to RTC in 3 months for her next surveillance visit. Reviewed surveillance of every 3 months until 5/2018 and then every 6 months x 3 additional years. Reviewed recommendations from [facility] not to perform surveillance pap smears in women diagnosed with endometrial cancer as this does not improve detection of local recurrence. Reviewed signs and symptoms for when she should contact the clinic or seek additional care. Patient to contact the clinic with any questions or concerns in the interim.     2.) Genetic risk factors were assessed and her MMR proteins were intact.      3.) Labs and/or tests ordered include:  None.      4.) Health maintenance issues addressed today include annual health maintenance and non-gynecologic issues with PCP, including HTN.     NATHAN Sneed,  WHNP-BC, ANP-BC  Women's Health Nurse Practitioner  Adult Nurse Pracitioner  Gynecologic Oncology          CC  Patient Care Team:  Debra Wolfe NP as PCP - General (Nurse Practitioner)  SELF, REFERRED

## 2017-11-16 NOTE — PROGRESS NOTES
Consult Notes on Referred Patient    Date: 2017       Dr. Debra Wolfe, NP  [address] [place] [address]       RE: Yashira Son  : 1945  WAGNER: 2017    Dear Dr. Debra Wolfe:    I had the pleasure of seeing your patient Yashira Son here at the Gynecologic Cancer Clinic at the [facility] on 2017.  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  She is here today for surveillance and disease management.     HPI:  Ms. Son is a 72 year old woman who originally presented with post-menopausal bleeding in 2016 with atypical glandular cell pap in 2015. Biopsy revealed adenocarcinoma and the patient proceeded with subsequent robotic hysterectomy, BSO and cancer staging on 12/18/15. No malignancy seen at time of procedure and peritoneal surfaces and omentum were clear. Final pathology report showed positive washings, a 2.1 cm noninvasive high-grade serous endometrial cancer, and benign polyps. + fibroids. DNA mismatch repair intact showing this is not due to hoyos. ER receptor was 66% positive and DE receptor was 38% positive --> stage IA high-grade serous endometrial cancer with positive washings. Patient underwent 6 cycles of chemotherapy, completed 16. Dose reduction of Taxol done during cycle #4 due to neuropathy of fingers and toes. CT scan was negative on 16. Other notable history includes: atrial fibrillation on coumadin, morbid obesity, adult-onset diabetes, asthma and hyperlipidemia.     12/18/15: DaVinci TLH/BSO/bilateral PPALND/omental biopsy with Dr. Brunner for high grade serous endometrial cancer.    PATH REREAD:A. UTERUS, FALLOPIAN TUBES AND OVARIES, HYSTERECTOMY AND BILATERAL   SALPINGO-OOPHORECTOMY:   - Endometrial serous carcinoma   - Maximum tumor size 2.1cm   - Myometrial invasion absent   -  Lower uterine segment, cervix and uterine serosa - uninvolved   - Lymphovascular invasion - absent   - Margins negative for tumor   - Ovary and fallopian tubes negative for carcinoma.   - Leiomyomas     B. LYMPH NODES, RIGHT PERIAORTIC, DISSECTION:   - Two lymph nodes negative for carcinoma (0/2)     C. LYMPH NODE, LEFT PERIAORTIC, DISSECTION:   - One lymph nodes negative for carcinoma (0/1)     D. LYMPH NODES, LEFT PELVIC, DISSECTION:   - Two lymph nodes negative for carcinoma (0/2)     E. LYMPH NODES, RIGHT PARA-AORTIC DISSECTION:   - Nine lymph nodes negative for carcinoma (0/9)     F. OMENTUM, OMENTECTOMY:   - Adipose tissue negative for carcinoma     COMMENT:   Tumor cells are focally positive for estrogen receptor (5%) and negative   for progesterone receptor.   DNA mismatch repair enzymes are intact. Immunostains performed in the   outside institution are reviewed in-house.     CT C/A/P at OSH (6/2016): negative for masses except stable left lung nodule    8/10/17: Vaginal Cuff Biopsy  SPECIMEN(S):   Vaginal cuff biopsy     FINAL DIAGNOSIS:   VAGINA, VAGINAL CUFF BIOPSY:   - Granulation tissue   - Negative for malignancy     Today: Ms. Son was last seen by me in August of this year. SHe presents to clinic today feeling well.     Review of Systems:    Systemic           no unintentional weight changes; no fever; no chills; no night sweats; no appetite changes  Skin           no rashes, or lesions  Eye           no irritation; no changes in vision  [place]-Laryngeal           no dysphagia; no hoarseness   Pulmonary    no cough; no shortness of breath  Cardiovascular    no chest pain; no palpitations  Gastrointestinal    no diarrhea; no constipation; no abdominal pain; no changes in bowel  habits; no blood in stool  Genitourinary   no urinary frequency; no urinary urgency; no dysuria; no pain; no abnormal vaginal discharge; no abnormal vaginal bleeding  Breast   no breast discharge; no breast changes; no breast  pain  Musculoskeletal    no myalgias; no arthralgias; no back pain  Psychiatric           + grief, no depressed mood; no anxiety    Hematologic           no tender lymph nodes; no noticeable swellings or lumps   Endocrine    no hot flashes; no heat/cold intolerance         Neurological   no tremor; no numbness and tingling; no headaches; no difficulty  sleeping    I have reviewed and addressed the patient's review of symptoms for today's visit.     Past Medical History:    Past Medical History:   Diagnosis Date     Anxiety      Asthma      Atrial fibrillation (H)      Depression      Endometrial cancer (H)      Hyperlipidemia      RONNY (obstructive sleep apnea)      Prediabetes          Past Surgical History:    Past Surgical History:   Procedure Laterality Date     DAVINCI HYSTERECTOMY TOTAL, BILATERAL SALPINGO-OOPHORECTOMY, NODE DISSECTION, COMBINED       oophorectomy Right     XL, dermoid, in 1981     THYROIDECTOMY      subtotal         Health Maintenance:  Health Maintenance Due   Topic Date Due     TETANUS IMMUNIZATION (SYSTEM ASSIGNED)  07/23/1961     LIPID SCREEN Q5 YR FEMALE (SYSTEM ASSIGNED)  07/23/1988     MAMMO SCREEN Q2 YR (SYSTEM ASSIGNED)  07/23/1993     COLON CANCER SCREEN (SYSTEM ASSIGNED)  07/23/1993     ADVANCE DIRECTIVE PLANNING Q5 YRS  07/23/1998     FALL RISK ASSESSMENT  07/23/2008     DEXA SCAN SCREENING (SYSTEM ASSIGNED)  07/23/2008     PNEUMOCOCCAL (1 of 2 - PCV13) 07/23/2008     INFLUENZA VACCINE (SYSTEM ASSIGNED)  09/01/2017       Last Pap Smear: 2015              Result: abnormal: glandular cell pap   She has not had a history of abnormal Pap smears prior to this.     Last Mammogram: 2013              Result: normal      She has not had a history of abnormal mammograms.    Last Colonoscopy: 2013              Result: normal                      Current Medications:     has a current medication list which includes the following prescription(s): alendronate, dexlansoprazole, metformin,  atorvastatin, fluticasone-salmeterol, ibuprofen, oxybutynin, metoprolol, warfarin, tizanidine hcl, escitalopram, zolpidem, montelukast, prochlorperazine maleate, cholecalciferol, b complex-biotin-fa, calcium-magnesium, and iohexol.       Allergies:    No Known Allergies       Social History:     Social History   Substance Use Topics     Smoking status: Former Smoker     Smokeless tobacco: Never Used      Comment: quite 40+ years ago      Alcohol use No      Comment: socially       History   Drug Use No           Family History:   No known family history, but most is unknown to her.     Family History   Problem Relation Age of Onset     Adopted: Yes         Physical Exam:     There were no vitals taken for this visit.  There is no height or weight on file to calculate BMI.    General Appearance: healthy and alert, no distress     HEENT:  no thyromegaly, no palpable nodules or masses        Cardiovascular: irregular rate and rhythm, no gallops, rubs or murmurs     Respiratory: lungs clear, no rales, rhonchi or wheezes, normal diaphragmatic excursion    Musculoskeletal: extremities non tender and without edema    Skin: no lesions or rashes     Neurological: normal gait, no gross defects     Psychiatric: appropriate mood and affect                               Hematological: normal cervical, supraclavicular and inguinal lymph nodes     Gastrointestinal:       abdomen obese, soft, non-tender, non-distended, no organomegaly or masses, pannus with small erythematous area consistent with yeast approximately 4 x 2cm    Genitourinary: External genitalia and urethral meatus appears normal.  1.5 cm nodule consistent with lipoma noted on left gluteal area. Vagina is smooth without nodularity or masses.  Vaginal cuff appears normal and without lesions except for 0.5 cm fleshy appearing lesion, possible granulation tissue on cuff that was removed with biopsy forceps.  Bimanual exam reveal no masses, nodularity or fullness.   Recto-vaginal exam confirms these findings.      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 Recommend CT C/A/P.  Discussed recommendations for Q3 month visits for the first two years followed by Q6 months visit for the subsequent three years.  No concerning findings on exam except for vaginal cuff lesion, appearance consistent with polyp but will follow-up on pathology.       2.) Genetic risk factors were assessed and the patient does not meet the qualifications for a referral.      3.) Labs and/or tests ordered include:  CT C/A/P for surveillance      4.) Health maintenance issues addressed today include yearly mammograms and continued follow-up with PCP for management of medical co-morbidities.  Follow-up with counselor and PCP regarding mental health. Offered referral to clinic  per patient preference.     5.)  Pannus candidiasis: discussed hygiene to area, rx provided for nystatin cream.       Thank you for allowing us to participate in the care of your patient.         Sincerely,  Enriqueta Chairez MD    Department of Ob/Gyn and Women's Health  Division of Gynecologic Oncology  [facility]  [phone]    I saw and evaluated the patient with the resident.  I edited and reviewed the above note.   The resident acted as a scribe for this visit.  Of a 35 minute appointment, more than 50% was spent in counseling the patient.    Addendum:  SPECIMEN(S):   Vaginal cuff biopsy     FINAL DIAGNOSIS:   VAGINA, VAGINAL CUFF BIOPSY:   - Granulation tissue   - Negative for malignancy   CC  Patient Care Team:  Debra Wolfe NP as PCP - General (Nurse Practitioner)  DEBRA WOLFE Cara Clure, MD PGY-3 acted as a scribe for Dr. Chairez for today's visit.

## 2018-02-01 ENCOUNTER — TELEPHONE (OUTPATIENT)
Dept: ONCOLOGY | Facility: CLINIC | Age: 73
End: 2018-02-01

## 2018-02-01 NOTE — TELEPHONE ENCOUNTER
Pt called to report that for the past few days, she has noted some intermittent vaginal discomfort.  She denies bleeding.  Discussed with MARY ANN Longoria.  Pt should be seen by one of the Gyn-Onc APPs next week at pt's convenience.  Scheduling team will contact pt to schedule.

## 2018-02-06 PROBLEM — Z53.9 NO SHOW: Status: ACTIVE | Noted: 2018-02-06

## 2018-02-27 ENCOUNTER — ONCOLOGY VISIT (OUTPATIENT)
Dept: ONCOLOGY | Facility: CLINIC | Age: 73
End: 2018-02-27
Attending: NURSE PRACTITIONER
Payer: MEDICARE

## 2018-02-27 VITALS
HEIGHT: 65 IN | WEIGHT: 272.4 LBS | HEART RATE: 85 BPM | DIASTOLIC BLOOD PRESSURE: 77 MMHG | SYSTOLIC BLOOD PRESSURE: 114 MMHG | TEMPERATURE: 97.6 F | OXYGEN SATURATION: 93 % | BODY MASS INDEX: 45.38 KG/M2 | RESPIRATION RATE: 20 BRPM

## 2018-02-27 DIAGNOSIS — E66.01 MORBID OBESITY (H): ICD-10-CM

## 2018-02-27 DIAGNOSIS — C54.1 ENDOMETRIAL CANCER (H): Primary | ICD-10-CM

## 2018-02-27 PROCEDURE — G0463 HOSPITAL OUTPT CLINIC VISIT: HCPCS | Mod: ZF

## 2018-02-27 PROCEDURE — 99213 OFFICE O/P EST LOW 20 MIN: CPT | Mod: ZP | Performed by: NURSE PRACTITIONER

## 2018-02-27 ASSESSMENT — PAIN SCALES - GENERAL: PAINLEVEL: NO PAIN (0)

## 2018-02-27 NOTE — PROGRESS NOTES
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  She is here today for a surveillance visit.      HPI:  Ms. Son originally presented with post-menopausal bleeding in 2016 with atypical glandular cell pap in 2015. Biopsy revealed adenocarcinoma and the patient proceeded with subsequent robotic hysterectomy, BSO and cancer staging on 12/18/15. No malignancy seen at time of procedure and peritoneal surfaces and omentum were clear. Final pathology report showed positive washings, a 2.1 cm noninvasive high-grade serous endometrial cancer, and benign polyps. + fibroids. DNA mismatch repair intact showing this is not due to hoyos. ER receptor was 66% positive and NM receptor was 38% positive --> stage IA high-grade serous endometrial cancer with positive washings. Patient underwent 6 cycles of chemotherapy, completed 16. Dose reduction of Taxol done during cycle #4 due to neuropathy of fingers and toes. CT scan was negative on 16. Other notable history includes: atrial fibrillation on coumadin, morbid obesity, adult-onset diabetes, asthma and hyperlipidemia.     12/18/15: DaVinci TLH/BSO/bilateral PPALND/omental biopsy with Dr. Brunner for high grade serous endometrial cancer.      PATH REREAD:A. UTERUS, FALLOPIAN TUBES AND OVARIES, HYSTERECTOMY AND BILATERAL   SALPINGO-OOPHORECTOMY:   - Endometrial serous carcinoma   - Maximum tumor size 2.1cm   - Myometrial invasion absent   - Lower uterine segment, cervix and uterine serosa - uninvolved   - Lymphovascular invasion - absent   - Margins negative for tumor   - Ovary and fallopian tubes negative for carcinoma.   - Leiomyomas     B. LYMPH NODES, RIGHT PERIAORTIC, DISSECTION:   - Two lymph nodes negative for carcinoma (0/2)     C. LYMPH NODE, LEFT PERIAORTIC,  DISSECTION:   - One lymph nodes negative for carcinoma (0/1)     D. LYMPH NODES, LEFT PELVIC, DISSECTION:   - Two lymph nodes negative for carcinoma (0/2)     E. LYMPH NODES, RIGHT PARA-AORTIC DISSECTION:   - Nine lymph nodes negative for carcinoma (0/9)     F. OMENTUM, OMENTECTOMY:   - Adipose tissue negative for carcinoma     COMMENT:   Tumor cells are focally positive for estrogen receptor (5%) and negative for progesterone receptor. DNA mismatch repair enzymes are intact. Immunostains performed in the outside institution are reviewed in-house.       CT C/A/P at OSH (6/2016): negative for masses except stable left lung nodule     She was lost to follow up until 8/2017.        Today she comes to clinic feeling well. She did have one episode of light pink on the toilet paper; however she did also scratch herself at the time. She denies any spotting or bleeding since. She denies any vaginal bleeding, no changes in her bowel or bladder habits, no nausea/emesis, no lower extremity edema, and no difficulties eating or sleeping. She denies any abdominal discomfort/bloating, no fevers or chills, and no chest pain or shortness of breath but does have PRESTON. She is not sexually active, is current with her annual exam and colonoscopy; her mammogram is due. She does not check her BG at home but is taking her Metformin.       Review of Systems:    Systemic           no weight changes; no fever; no chills; no night sweats; no appetite changes  Skin           no rashes, or lesions  Eye           no irritation; no changes in vision  [place]-Laryngeal           no dysphagia; no hoarseness   Pulmonary    no cough; no shortness of breath  Cardiovascular    no chest pain; no palpitations; + HTN  Gastrointestinal    no diarrhea; no constipation; no abdominal pain; no changes in bowel habits; no blood in stool  Genitourinary   no urinary frequency; no urinary urgency; no dysuria; no pain; no abnormal vaginal discharge; no abnormal vaginal  bleeding  Breast    no breast discharge; no breast changes; no breast pain  Musculoskeletal    no myalgias; + arthralgias; + back pain  Psychiatric           no depressed mood; + anxiety    Hematologic               no tender lymph nodes; no noticeable swellings or lumps   Endocrine    no hot flashes; no heat/cold intolerance         Neurological   no tremor; no numbness and tingling; no headaches; no difficulty sleeping      Past Medical History:    Past Medical History:   Diagnosis Date     Anxiety      Asthma      Atrial fibrillation (H)      Depression      Endometrial cancer (H)      Hyperlipidemia      RONNY (obstructive sleep apnea)      Prediabetes          Past Surgical History:    Past Surgical History:   Procedure Laterality Date     DAVINCI HYSTERECTOMY TOTAL, BILATERAL SALPINGO-OOPHORECTOMY, NODE DISSECTION, COMBINED       oophorectomy Right     XL, dermoid, in 1981     THYROIDECTOMY      subtotal         Health Maintenance Due   Topic Date Due     TETANUS IMMUNIZATION (SYSTEM ASSIGNED)  07/23/1961     LIPID SCREEN Q5 YR FEMALE (SYSTEM ASSIGNED)  07/23/1988     MAMMO SCREEN Q2 YR (SYSTEM ASSIGNED)  07/23/1993     COLON CANCER SCREEN (SYSTEM ASSIGNED)  07/23/1993     ADVANCE DIRECTIVE PLANNING Q5 YRS  07/23/1998     FALL RISK ASSESSMENT  07/23/2008     DEXA SCAN SCREENING (SYSTEM ASSIGNED)  07/23/2008     PNEUMOCOCCAL (1 of 2 - PCV13) 07/23/2008     INFLUENZA VACCINE (SYSTEM ASSIGNED)  09/01/2017       Current Medications:     Current Outpatient Prescriptions   Medication Sig Dispense Refill     alendronate (FOSAMAX) 70 MG tablet Take 70 mg by mouth once a week       dexlansoprazole (DEXILANT) 30 MG CPDR CR capsule Take 60 mg by mouth daily        metFORMIN (GLUCOPHAGE) 500 MG tablet Take 500 mg by mouth 3 times daily (with meals)       atorvastatin (LIPITOR) 20 MG tablet Take 20 mg by mouth daily        fluticasone-salmeterol (ADVAIR DISKUS) 250-50 MCG/DOSE diskus inhaler Inhale 1 puff into the lungs 2  "times daily        ibuprofen (ADVIL/MOTRIN) 800 MG tablet Take 800 mg by mouth 3 times daily as needed        oxybutynin (DITROPAN-XL) 10 MG 24 hr tablet Take 10 mg by mouth daily        metoprolol (LOPRESSOR) 25 MG tablet Take 25 mg by mouth 2 times daily        warfarin (COUMADIN) 5 MG tablet Take 5 mg by mouth daily 2.5 mg (1/2 tablet) Tu-Su, whole tablet on Monday.       TiZANidine HCl 2 MG CAPS Take 2 mg by mouth every 6 hours as needed        escitalopram (LEXAPRO) 20 MG tablet Take 20 mg by mouth daily        cholecalciferol (VITAMIN D3) 5000 UNITS TABS tablet Take 5,000 Units by mouth daily       B Complex-Biotin-FA (B-50 COMPLEX PO) Take by mouth daily        calcium-magnesium (CALMAG) 500-250 MG TABS per tablet Take 1 tablet by mouth 2 times daily (with meals)       zolpidem (AMBIEN) 5 MG tablet Take 5 mg by mouth nightly as needed        montelukast (SINGULAIR) 10 MG tablet Take 10 mg by mouth At Bedtime       PROCHLORPERAZINE MALEATE PO Take 10 mg by mouth every 6 hours as needed for nausea or vomiting       iohexol (OMNIPAQUE) 140 MG/ML SOLN solution Mix entire bottle (50ml) of contast with 600ml (20 ounces) of water and drink half 2 hrs prior to CT scan and half 1 hr prior to scan (Patient not taking: Reported on 11/16/2017) 140 mL 0         Allergies:      No Known Allergies     Social History:     Social History   Substance Use Topics     Smoking status: Former Smoker     Smokeless tobacco: Never Used      Comment: quite 40+ years ago      Alcohol use No      Comment: socially       History   Drug Use No         Family History:       Family History   Problem Relation Age of Onset     Adopted: Yes         Physical Exam:     /77  Pulse 85  Temp 97.6  F (36.4  C) (Oral)  Resp 20  Ht 1.651 m (5' 5\")  Wt 123.6 kg (272 lb 6.4 oz)  SpO2 93%  BMI 45.33 kg/m2  Body mass index is 45.33 kg/(m^2).    General Appearance: healthy and alert, no distress     HEENT: no thyromegaly, no palpable nodules or " masses        Cardiovascular: regular rate and rhythm, no gallops, rubs or murmurs     Respiratory: lungs clear, no rales, rhonchi or wheezes, normal diaphragmatic excursion    Musculoskeletal: extremities non tender and without edema    Skin: no lesions or rashes     Neurological: normal gait, no gross defects     Psychiatric: appropriate mood and affect                               Hematological: normal cervical, supraclavicular and inguinal lymph nodes     Gastrointestinal:       abdomen soft, obese, non-tender, non-distended, no organomegaly or masses    Genitourinary: External genitalia and urethral meatus appears normal.  Vagina is smooth without nodularity or masses.  Cervix surgically absent.  Bimanual exam reveal no masses, nodularity or fullness--exam limited secondary to body habitus.  Recto-vaginal exam confirms these findings.      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  She is here today for a surveillance visit.     20 minutes were spent with this patient, over 50% of that time was spent in symptom management, treatment planning and in counseling and coordination of care..      Plan:     1.)       She is now 2 years out from surgery and can extend her surveillance to every 6 months x 3 years. Patient to RTC in 6 months for her next surveillance visit. Reviewed signs and symptoms for when she should contact the clinic or seek additional care.Patient to contact the clinic with any questions or concerns in the interim.     2.) Genetic risk factors were assessed and she had intact MMR proteins.       3.) Labs and/or tests ordered include:  None today.      4.) Health maintenance issues addressed today include annual health maintenance and non-gynecologic issues with PCP.    NATHAN Sneed, WHNP-BC, ANP-BC  Women's Health Nurse Practitioner  Adult Nurse Pracitioner  Division of Gynecologic  Oncology          CC  Patient Care Team:  Debra Wolfe NP as PCP - General (Nurse Practitioner)  SELF, REFERRED

## 2018-02-27 NOTE — NURSING NOTE
"Oncology Rooming Note    February 27, 2018 1:54 PM   Yashira Son is a 72 year old female who presents for:    Chief Complaint   Patient presents with     Oncology Clinic Visit     3 Mo F/U, Endometrial cancer (H).     Initial Vitals: /77  Pulse 85  Temp 97.6  F (36.4  C) (Oral)  Resp 20  Ht 1.651 m (5' 5\")  Wt 123.6 kg (272 lb 6.4 oz)  SpO2 93%  BMI 45.33 kg/m2 Estimated body mass index is 45.33 kg/(m^2) as calculated from the following:    Height as of this encounter: 1.651 m (5' 5\").    Weight as of this encounter: 123.6 kg (272 lb 6.4 oz). Body surface area is 2.38 meters squared.  No Pain (0) Comment: Data Unavailable   No LMP recorded. Patient has had a hysterectomy.  Allergies reviewed: Yes  Medications reviewed: Yes    Medications: Medication refills not needed today.  Pharmacy name entered into [facility]: [id number] - [address] AT [address] [place] [address]    Clinical concerns: None Adrianna Hendricks was NOT notified.    7 minutes for nursing intake (face to face time)     Zara Cardenas LPN              "

## 2018-02-27 NOTE — LETTER
2018       RE: Yashira Son  [address]  [address]  [facility] [address]     Dear Colleague,    Thank you for referring your patient, Yashira Son, to the [facility]. Please see a copy of my visit note below.                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  She is here today for a surveillance visit.      HPI:  Ms. Son originally presented with post-menopausal bleeding in 2016 with atypical glandular cell pap in 2015. Biopsy revealed adenocarcinoma and the patient proceeded with subsequent robotic hysterectomy, BSO and cancer staging on 12/18/15. No malignancy seen at time of procedure and peritoneal surfaces and omentum were clear. Final pathology report showed positive washings, a 2.1 cm noninvasive high-grade serous endometrial cancer, and benign polyps. + fibroids. DNA mismatch repair intact showing this is not due to hoyos. ER receptor was 66% positive and LA receptor was 38% positive --> stage IA high-grade serous endometrial cancer with positive washings. Patient underwent 6 cycles of chemotherapy, completed 16. Dose reduction of Taxol done during cycle #4 due to neuropathy of fingers and toes. CT scan was negative on 16. Other notable history includes: atrial fibrillation on coumadin, morbid obesity, adult-onset diabetes, asthma and hyperlipidemia.     12/18/15: DaVinci TLH/BSO/bilateral PPALND/omental biopsy with Dr. Brunner for high grade serous endometrial cancer.      PATH REREAD:A. UTERUS, FALLOPIAN TUBES AND OVARIES, HYSTERECTOMY AND BILATERAL   SALPINGO-OOPHORECTOMY:   - Endometrial serous carcinoma   - Maximum tumor size 2.1cm   - Myometrial invasion absent   - Lower uterine segment, cervix and uterine serosa - uninvolved   - Lymphovascular invasion -  absent   - Margins negative for tumor   - Ovary and fallopian tubes negative for carcinoma.   - Leiomyomas     B. LYMPH NODES, RIGHT PERIAORTIC, DISSECTION:   - Two lymph nodes negative for carcinoma (0/2)     C. LYMPH NODE, LEFT PERIAORTIC, DISSECTION:   - One lymph nodes negative for carcinoma (0/1)     D. LYMPH NODES, LEFT PELVIC, DISSECTION:   - Two lymph nodes negative for carcinoma (0/2)     E. LYMPH NODES, RIGHT PARA-AORTIC DISSECTION:   - Nine lymph nodes negative for carcinoma (0/9)     F. OMENTUM, OMENTECTOMY:   - Adipose tissue negative for carcinoma     COMMENT:   Tumor cells are focally positive for estrogen receptor (5%) and negative for progesterone receptor. DNA mismatch repair enzymes are intact. Immunostains performed in the outside institution are reviewed in-house.       CT C/A/P at OSH (6/2016): negative for masses except stable left lung nodule     She was lost to follow up until 8/2017.        Today she comes to clinic feeling well. She did have one episode of light pink on the toilet paper; however she did also scratch herself at the time. She denies any spotting or bleeding since. She denies any vaginal bleeding, no changes in her bowel or bladder habits, no nausea/emesis, no lower extremity edema, and no difficulties eating or sleeping. She denies any abdominal discomfort/bloating, no fevers or chills, and no chest pain or shortness of breath but does have PRESTON. She is not sexually active, is current with her annual exam and colonoscopy; her mammogram is due. She does not check her BG at home but is taking her Metformin.       Review of Systems:    Systemic           no weight changes; no fever; no chills; no night sweats; no appetite changes  Skin           no rashes, or lesions  Eye           no irritation; no changes in vision  [place]-Laryngeal           no dysphagia; no hoarseness   Pulmonary    no cough; no shortness of breath  Cardiovascular    no chest pain; no palpitations; +  HTN  Gastrointestinal    no diarrhea; no constipation; no abdominal pain; no changes in bowel habits; no blood in stool  Genitourinary   no urinary frequency; no urinary urgency; no dysuria; no pain; no abnormal vaginal discharge; no abnormal vaginal bleeding  Breast    no breast discharge; no breast changes; no breast pain  Musculoskeletal    no myalgias; + arthralgias; + back pain  Psychiatric           no depressed mood; + anxiety    Hematologic               no tender lymph nodes; no noticeable swellings or lumps   Endocrine    no hot flashes; no heat/cold intolerance         Neurological   no tremor; no numbness and tingling; no headaches; no difficulty sleeping      Past Medical History:    Past Medical History:   Diagnosis Date     Anxiety      Asthma      Atrial fibrillation (H)      Depression      Endometrial cancer (H)      Hyperlipidemia      RONNY (obstructive sleep apnea)      Prediabetes          Past Surgical History:    Past Surgical History:   Procedure Laterality Date     DAVINCI HYSTERECTOMY TOTAL, BILATERAL SALPINGO-OOPHORECTOMY, NODE DISSECTION, COMBINED       oophorectomy Right     XL, dermoid, in 1981     THYROIDECTOMY      subtotal         Health Maintenance Due   Topic Date Due     TETANUS IMMUNIZATION (SYSTEM ASSIGNED)  07/23/1961     LIPID SCREEN Q5 YR FEMALE (SYSTEM ASSIGNED)  07/23/1988     MAMMO SCREEN Q2 YR (SYSTEM ASSIGNED)  07/23/1993     COLON CANCER SCREEN (SYSTEM ASSIGNED)  07/23/1993     ADVANCE DIRECTIVE PLANNING Q5 YRS  07/23/1998     FALL RISK ASSESSMENT  07/23/2008     DEXA SCAN SCREENING (SYSTEM ASSIGNED)  07/23/2008     PNEUMOCOCCAL (1 of 2 - PCV13) 07/23/2008     INFLUENZA VACCINE (SYSTEM ASSIGNED)  09/01/2017       Current Medications:     Current Outpatient Prescriptions   Medication Sig Dispense Refill     alendronate (FOSAMAX) 70 MG tablet Take 70 mg by mouth once a week       dexlansoprazole (DEXILANT) 30 MG CPDR CR capsule Take 60 mg by mouth daily        metFORMIN  (GLUCOPHAGE) 500 MG tablet Take 500 mg by mouth 3 times daily (with meals)       atorvastatin (LIPITOR) 20 MG tablet Take 20 mg by mouth daily        fluticasone-salmeterol (ADVAIR DISKUS) 250-50 MCG/DOSE diskus inhaler Inhale 1 puff into the lungs 2 times daily        ibuprofen (ADVIL/MOTRIN) 800 MG tablet Take 800 mg by mouth 3 times daily as needed        oxybutynin (DITROPAN-XL) 10 MG 24 hr tablet Take 10 mg by mouth daily        metoprolol (LOPRESSOR) 25 MG tablet Take 25 mg by mouth 2 times daily        warfarin (COUMADIN) 5 MG tablet Take 5 mg by mouth daily 2.5 mg (1/2 tablet) Tu-Su, whole tablet on Monday.       TiZANidine HCl 2 MG CAPS Take 2 mg by mouth every 6 hours as needed        escitalopram (LEXAPRO) 20 MG tablet Take 20 mg by mouth daily        cholecalciferol (VITAMIN D3) 5000 UNITS TABS tablet Take 5,000 Units by mouth daily       B Complex-Biotin-FA (B-50 COMPLEX PO) Take by mouth daily        calcium-magnesium (CALMAG) 500-250 MG TABS per tablet Take 1 tablet by mouth 2 times daily (with meals)       zolpidem (AMBIEN) 5 MG tablet Take 5 mg by mouth nightly as needed        montelukast (SINGULAIR) 10 MG tablet Take 10 mg by mouth At Bedtime       PROCHLORPERAZINE MALEATE PO Take 10 mg by mouth every 6 hours as needed for nausea or vomiting       iohexol (OMNIPAQUE) 140 MG/ML SOLN solution Mix entire bottle (50ml) of contast with 600ml (20 ounces) of water and drink half 2 hrs prior to CT scan and half 1 hr prior to scan (Patient not taking: Reported on 11/16/2017) 140 mL 0         Allergies:      No Known Allergies     Social History:     Social History   Substance Use Topics     Smoking status: Former Smoker     Smokeless tobacco: Never Used      Comment: quite 40+ years ago      Alcohol use No      Comment: socially       History   Drug Use No         Family History:       Family History   Problem Relation Age of Onset     Adopted: Yes         Physical Exam:     /77  Pulse 85  Temp  "97.6  F (36.4  C) (Oral)  Resp 20  Ht 1.651 m (5' 5\")  Wt 123.6 kg (272 lb 6.4 oz)  SpO2 93%  BMI 45.33 kg/m2  Body mass index is 45.33 kg/(m^2).    General Appearance: healthy and alert, no distress     HEENT: no thyromegaly, no palpable nodules or masses        Cardiovascular: regular rate and rhythm, no gallops, rubs or murmurs     Respiratory: lungs clear, no rales, rhonchi or wheezes, normal diaphragmatic excursion    Musculoskeletal: extremities non tender and without edema    Skin: no lesions or rashes     Neurological: normal gait, no gross defects     Psychiatric: appropriate mood and affect                               Hematological: normal cervical, supraclavicular and inguinal lymph nodes     Gastrointestinal:       abdomen soft, obese, non-tender, non-distended, no organomegaly or masses    Genitourinary: External genitalia and urethral meatus appears normal.  Vagina is smooth without nodularity or masses.  Cervix surgically absent.  Bimanual exam reveal no masses, nodularity or fullness--exam limited secondary to body habitus.  Recto-vaginal exam confirms these findings.      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  She is here today for a surveillance visit.     20 minutes were spent with this patient, over 50% of that time was spent in symptom management, treatment planning and in counseling and coordination of care..      Plan:     1.)       She is now 2 years out from surgery and can extend her surveillance to every 6 months x 3 years. Patient to RTC in 6 months for her next surveillance visit. Reviewed signs and symptoms for when she should contact the clinic or seek additional care.Patient to contact the clinic with any questions or concerns in the interim.     2.) Genetic risk factors were assessed and she had intact MMR proteins.       3.) Labs and/or tests ordered include:  None today.      4.) Health " maintenance issues addressed today include annual health maintenance and non-gynecologic issues with PCP.    NATHAN Sneed, WHNP-BC, ANP-BC  Women's Health Nurse Practitioner  Adult Nurse Pracitioner  Division of Gynecologic Oncology      CC  Patient Care Team:  Debra Wolfe NP as PCP - General (Nurse Practitioner)

## 2018-02-27 NOTE — MR AVS SNAPSHOT
"              After Visit Summary   2/27/2018    Yashira Son    MRN: [id number]           Patient Information     Date Of Birth          1945        Visit Information        Provider Department      2/27/2018 2:00 PM Adrianna Hendricks APRN CNP [facility]        Today's Diagnoses     Endometrial cancer (H)    -  1       Follow-ups after your visit        Follow-up notes from your care team     Return in about 6 months (around 8/27/2018).      Your next 10 appointments already scheduled     Aug 28, 2018  2:00 PM CDT   (Arrive by 1:45 PM)   Return Visit with NATHAN James CNP   [facility] ([facility])    [address]  [address]  [facility] [address] [phone]              Who to contact     If you have questions or need follow up information about today's clinic visit or your schedule please contact [facility] directly at [phone].  Normal or non-critical lab and imaging results will be communicated to you by Sp[employer] Mediahart, letter or phone within 4 business days after the clinic has received the results. If you do not hear from us within 7 days, please contact the clinic through Coolirist or phone. If you have a critical or abnormal lab result, we will notify you by phone as soon as possible.  Submit refill requests through [employer] or call your pharmacy and they will forward the refill request to us. Please allow 3 business days for your refill to be completed.          Additional Information About Your Visit        Spruce MediaharDovetail Information     Nexgate lets you send messages to your doctor, view your test results, renew your prescriptions, schedule appointments and more. To sign up, go to [email or web address] . Click on \"Log in\" on the left side of the screen, which will take you to the Welcome page. Then click on \"Sign up Now\" on the right side of the page.     You will be asked to enter the " "access code listed below, as well as some personal information. Please follow the directions to create your username and password.     Your access code is: [email or web address]  Expires: 2018  6:31 AM     Your access code will  in 90 days. If you need help or a new code, please call your [facility] or [phone].        Care EveryWhere ID     This is your Care EveryWhere ID. This could be used by other organizations to access your [facility] medical records  [id number]        Your Vitals Were     Pulse Temperature Respirations Height Pulse Oximetry BMI (Body Mass Index)    85 97.6  F (36.4  C) (Oral) 20 1.651 m (5' 5\") 93% 45.33 kg/m2       Blood Pressure from Last 3 Encounters:   18 114/77   17 115/85   08/10/17 111/79    Weight from Last 3 Encounters:   18 123.6 kg (272 lb 6.4 oz)   17 124.6 kg (274 lb 9.6 oz)   08/10/17 122.5 kg (270 lb)              Today, you had the following     No orders found for display       Primary Care Provider Office Phone # Fax #    Debra Wolfe [phone]        [facility] [address] [address]        Equal Access to Services     INGRID ESTRADA : Hadii domitila patterson hadminervao Sosurjit, waaxda luqadaha, qaybta kaalmada adebekahda, dannielle love . So [facility] [phone].    ATENCIÓN: Si habla español, tiene a pichardo disposición servicios gratuitos de asistencia lingüística. Llmyla al [phone].    We comply with applicable federal civil rights laws and [place] laws. We do not discriminate on the basis of race, color, national origin, age, disability, sex, sexual orientation, or gender identity.            Thank you!     Thank you for choosing [facility]  for your care. Our goal is always to provide you with excellent care. Hearing back from our patients is one way we can continue to improve our services. Please take a few minutes to complete the written survey that you " may receive in the mail after your visit with us. Thank you!             Your Updated Medication List - Protect others around you: Learn how to safely use, store and throw away your medicines at www.disposemymeds.org.          This list is accurate as of 2/27/18  2:28 PM.  Always use your most recent med list.                   Brand Name Dispense Instructions for use Diagnosis    ADVAIR DISKUS 250-50 MCG/DOSE diskus inhaler   Generic drug:  fluticasone-salmeterol      Inhale 1 puff into the lungs 2 times daily        alendronate 70 MG tablet    FOSAMAX     Take 70 mg by mouth once a week        atorvastatin 20 MG tablet    LIPITOR     Take 20 mg by mouth daily        B-50 COMPLEX PO      Take by mouth daily        calcium-magnesium 500-250 MG Tabs per tablet    CALMAG     Take 1 tablet by mouth 2 times daily (with meals)        cholecalciferol 5000 UNITS Tabs tablet    vitamin D3     Take 5,000 Units by mouth daily        dexlansoprazole 30 MG Cpdr CR capsule    DEXILANT     Take 60 mg by mouth daily        escitalopram 20 MG tablet    LEXAPRO     Take 20 mg by mouth daily        ibuprofen 800 MG tablet    ADVIL/MOTRIN     Take 800 mg by mouth 3 times daily as needed        metFORMIN 500 MG tablet    GLUCOPHAGE     Take 500 mg by mouth 3 times daily (with meals)        metoprolol tartrate 25 MG tablet    LOPRESSOR     Take 25 mg by mouth 2 times daily        montelukast 10 MG tablet    SINGULAIR     Take 10 mg by mouth At Bedtime        oxybutynin 10 MG 24 hr tablet    DITROPAN-XL     Take 10 mg by mouth daily        PROCHLORPERAZINE MALEATE PO      Take 10 mg by mouth every 6 hours as needed for nausea or vomiting        TiZANidine HCl 2 MG Caps      Take 2 mg by mouth every 6 hours as needed        warfarin 5 MG tablet    COUMADIN     Take 5 mg by mouth daily 2.5 mg (1/2 tablet) Tu-Su, whole tablet on Monday.        zolpidem 5 MG tablet    AMBIEN     Take 5 mg by mouth nightly as needed

## 2018-08-28 ENCOUNTER — ONCOLOGY VISIT (OUTPATIENT)
Dept: ONCOLOGY | Facility: CLINIC | Age: 73
End: 2018-08-28
Attending: NURSE PRACTITIONER
Payer: MEDICARE

## 2018-08-28 VITALS
HEIGHT: 65 IN | HEART RATE: 103 BPM | TEMPERATURE: 98.6 F | SYSTOLIC BLOOD PRESSURE: 119 MMHG | BODY MASS INDEX: 45.05 KG/M2 | WEIGHT: 270.4 LBS | DIASTOLIC BLOOD PRESSURE: 82 MMHG | OXYGEN SATURATION: 100 %

## 2018-08-28 DIAGNOSIS — C54.1 ENDOMETRIAL CANCER (H): Primary | ICD-10-CM

## 2018-08-28 DIAGNOSIS — E66.01 MORBID OBESITY (H): ICD-10-CM

## 2018-08-28 PROCEDURE — 99213 OFFICE O/P EST LOW 20 MIN: CPT | Mod: ZP | Performed by: NURSE PRACTITIONER

## 2018-08-28 PROCEDURE — G0463 HOSPITAL OUTPT CLINIC VISIT: HCPCS | Mod: ZF

## 2018-08-28 RX ORDER — LIRAGLUTIDE 6 MG/ML
INJECTION SUBCUTANEOUS
Refills: 1 | COMMUNITY
Start: 2018-08-01

## 2018-08-28 RX ORDER — ONDANSETRON 4 MG/1
TABLET, FILM COATED ORAL
Refills: 5 | COMMUNITY
Start: 2018-02-03

## 2018-08-28 RX ORDER — DEXLANSOPRAZOLE 60 MG/1
CAPSULE, DELAYED RELEASE ORAL
Refills: 1 | COMMUNITY
Start: 2018-05-31

## 2018-08-28 RX ORDER — BLOOD SUGAR DIAGNOSTIC
STRIP MISCELLANEOUS
Refills: 1 | COMMUNITY
Start: 2018-08-02

## 2018-08-28 RX ORDER — ALBUTEROL SULFATE 90 UG/1
AEROSOL, METERED RESPIRATORY (INHALATION)
Refills: 3 | COMMUNITY
Start: 2018-08-01

## 2018-08-28 RX ORDER — ALENDRONATE SODIUM 35 MG/1
TABLET ORAL
Refills: 2 | COMMUNITY
Start: 2018-07-31

## 2018-08-28 RX ORDER — FUROSEMIDE 20 MG
TABLET ORAL
Refills: 1 | COMMUNITY
Start: 2017-12-22

## 2018-08-28 RX ORDER — BUPROPION HYDROCHLORIDE 150 MG/1
TABLET ORAL
Refills: 3 | COMMUNITY
Start: 2018-05-29

## 2018-08-28 RX ORDER — PEN NEEDLE, DIABETIC 32GX 5/32"
NEEDLE, DISPOSABLE MISCELLANEOUS
Refills: 2 | COMMUNITY
Start: 2018-06-21

## 2018-08-28 RX ORDER — TIZANIDINE 2 MG/1
TABLET ORAL
Refills: 2 | COMMUNITY
Start: 2018-07-31

## 2018-08-28 RX ORDER — LANCETS
EACH MISCELLANEOUS
Refills: 11 | COMMUNITY
Start: 2018-06-18

## 2018-08-28 RX ORDER — LISINOPRIL 2.5 MG/1
TABLET ORAL
Refills: 4 | COMMUNITY
Start: 2018-07-18

## 2018-08-28 RX ORDER — METFORMIN HCL 500 MG
TABLET, EXTENDED RELEASE 24 HR ORAL
Refills: 5 | COMMUNITY
Start: 2018-07-31

## 2018-08-28 RX ORDER — BLOOD-GLUCOSE METER
EACH MISCELLANEOUS
Refills: 0 | COMMUNITY
Start: 2018-06-18

## 2018-08-28 ASSESSMENT — PAIN SCALES - GENERAL: PAINLEVEL: NO PAIN (0)

## 2018-08-28 NOTE — PROGRESS NOTES
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  She is here today for a surveillance visit.       HPI:  Ms. Son originally presented with post-menopausal bleeding in 2016 with atypical glandular cell pap in 2015. Biopsy revealed adenocarcinoma and the patient proceeded with subsequent robotic hysterectomy, BSO and cancer staging on 12/18/15. No malignancy seen at time of procedure and peritoneal surfaces and omentum were clear. Final pathology report showed positive washings, a 2.1 cm noninvasive high-grade serous endometrial cancer, and benign polyps. + fibroids. DNA mismatch repair intact showing this is not due to hoyos. ER receptor was 66% positive and [place] receptor was 38% positive --> stage IA high-grade serous endometrial cancer with positive washings. Patient underwent 6 cycles of chemotherapy, completed 16. Dose reduction of Taxol done during cycle #4 due to neuropathy of fingers and toes. CT scan was negative on 16. Other notable history includes: atrial fibrillation on coumadin, morbid obesity, adult-onset diabetes, asthma and hyperlipidemia.     12/18/15: DaVinci TLH/BSO/bilateral PPALND/omental biopsy with Dr. Brunner for high grade serous endometrial cancer.      PATH REREAD:A. UTERUS, FALLOPIAN TUBES AND OVARIES, HYSTERECTOMY AND BILATERAL   SALPINGO-OOPHORECTOMY:   - Endometrial serous carcinoma   - Maximum tumor size 2.1cm   - Myometrial invasion absent   - Lower uterine segment, cervix and uterine serosa - uninvolved   - Lymphovascular invasion - absent   - Margins negative for tumor   - Ovary and fallopian tubes negative for carcinoma.   - Leiomyomas     B. LYMPH NODES, RIGHT PERIAORTIC, DISSECTION:   - Two lymph nodes negative for carcinoma (0/2)     C. LYMPH NODE, LEFT PERIAORTIC,  DISSECTION:   - One lymph nodes negative for carcinoma (0/1)     D. LYMPH NODES, LEFT PELVIC, DISSECTION:   - Two lymph nodes negative for carcinoma (0/2)     E. LYMPH NODES, RIGHT PARA-AORTIC DISSECTION:   - Nine lymph nodes negative for carcinoma (0/9)     F. OMENTUM, OMENTECTOMY:   - Adipose tissue negative for carcinoma     COMMENT:   Tumor cells are focally positive for estrogen receptor (5%) and negative for progesterone receptor. DNA mismatch repair enzymes are intact. Immunostains performed in the outside institution are reviewed in-house.       CT C/A/P at OSH (6/2016): negative for masses except stable left lung nodule      She was lost to follow up until 8/2017.      Today she comes to clinic feeling well and denies any concerns for this visit. She denies any vaginal bleeding, no changes in her bowel or bladder habits, no nausea/emesis, no lower extremity edema, and no difficulties eating or sleeping. She denies any abdominal discomfort/bloating, no fevers or chills, and no chest pain or shortness of breath. She is current with her health screenings. She was recently placed on Victoza and reports her BG have been running about 88-the low 100's. She is not sexually active and denies any vaginal or vulvar dryness.          Review of Systems:    Systemic           no weight changes; no fever; no chills; no night sweats; no appetite changes  Skin           no rashes, or lesions  Eye           no irritation; no changes in vision  [place]-Laryngeal           no dysphagia; no hoarseness   Pulmonary    no cough; no shortness of breath  Cardiovascular    no chest pain; no palpitations  Gastrointestinal    no diarrhea; no constipation; no abdominal pain; no changes in bowel habits; no blood in stool  Genitourinary   no urinary frequency; no urinary urgency; no dysuria; no pain; no abnormal vaginal discharge; no abnormal vaginal bleeding  Breast    no breast discharge; no breast changes; no breast pain  Musculoskeletal     no myalgias; no arthralgias; no back pain  Psychiatric           no depressed mood; no anxiety    Hematologic               no tender lymph nodes; no noticeable swellings or lumps   Endocrine    no hot flashes; no heat/cold intolerance         Neurological   no tremor; no numbness and tingling; no headaches; no difficulty sleeping      Past Medical History:    Past Medical History:   Diagnosis Date     Anxiety      Asthma      Atrial fibrillation (H)      Depression      Endometrial cancer (H)      Hyperlipidemia      RONNY (obstructive sleep apnea)      Prediabetes          Past Surgical History:    Past Surgical History:   Procedure Laterality Date     DAVINCI HYSTERECTOMY TOTAL, BILATERAL SALPINGO-OOPHORECTOMY, NODE DISSECTION, COMBINED       oophorectomy Right     XL, dermoid, in 1981     THYROIDECTOMY      subtotal         Health Maintenance Due   Topic Date Due     PHQ-2 Q1 YR  07/23/1955     TETANUS IMMUNIZATION (SYSTEM ASSIGNED)  07/23/1961     LIPID SCREEN Q5 YR FEMALE (SYSTEM ASSIGNED)  07/23/1988     ADVANCE DIRECTIVE PLANNING Q5 YRS  07/23/1998     FALL RISK ASSESSMENT  07/23/2008     DEXA SCAN SCREENING (SYSTEM ASSIGNED)  07/23/2008     PNEUMOCOCCAL (1 of 2 - PCV13) 07/23/2008       Current Medications:     Current Outpatient Prescriptions   Medication Sig Dispense Refill     ACCU-CHEK SHIRLEY PLUS test strip   1     alendronate (FOSAMAX) 35 MG tablet TK 1 T PO Q WEEK  2     alendronate (FOSAMAX) 70 MG tablet Take 70 mg by mouth once a week       atorvastatin (LIPITOR) 20 MG tablet Take 20 mg by mouth daily        B Complex-Biotin-FA (B-50 COMPLEX PO) Take by mouth daily        BD AMOS U/F 32G X 4 MM insulin pen needle USE UTD WITH VICTOZA PEN  2     blood glucose monitoring (ACCU-CHEK SHIRLEY PLUS) meter device kit USE UTD  0     blood glucose monitoring (SOFTCLIX) lancets TK UTD  11     buPROPion (WELLBUTRIN XL) 150 MG 24 hr tablet TK 1 T PO QAM FOR DEPRESSION  3     calcium-magnesium (CALMAG) 500-250 MG  TABS per tablet Take 1 tablet by mouth 2 times daily (with meals)       cholecalciferol (VITAMIN D3) 5000 UNITS TABS tablet Take 5,000 Units by mouth daily       DEXILANT 60 MG CPDR CR capsule TK ONE C PO QAM FOR GASTRIC REFLUX  1     dexlansoprazole (DEXILANT) 30 MG CPDR CR capsule Take 60 mg by mouth daily        escitalopram (LEXAPRO) 20 MG tablet Take 20 mg by mouth daily        fluticasone-salmeterol (ADVAIR DISKUS) 250-50 MCG/DOSE diskus inhaler Inhale 1 puff into the lungs 2 times daily        furosemide (LASIX) 20 MG tablet TK 2 TS PO D IF WEIGHT UP BY 3 POUNDS FROM BASELINE AND HAVE SWELLING IN LOWER EXTREMITIES. OTHERWISE TK 1 T D.  1     ibuprofen (ADVIL/MOTRIN) 800 MG tablet Take 800 mg by mouth 3 times daily as needed        lisinopril (PRINIVIL/ZESTRIL) 2.5 MG tablet TK 1 T PO QAM FOR KIDNEY PROTECTION  4     metFORMIN (GLUCOPHAGE) 500 MG tablet Take 500 mg by mouth 3 times daily (with meals)       metFORMIN (GLUCOPHAGE-XR) 500 MG 24 hr tablet TK 3 TS PO D WF  5     metoprolol (LOPRESSOR) 25 MG tablet Take 25 mg by mouth 2 times daily        montelukast (SINGULAIR) 10 MG tablet Take 10 mg by mouth At Bedtime       ondansetron (ZOFRAN) 4 MG tablet TK 1 T PO Q 8 H PRF NAUSEA  5     oxybutynin (DITROPAN-XL) 10 MG 24 hr tablet Take 10 mg by mouth daily        PROCHLORPERAZINE MALEATE PO Take 10 mg by mouth every 6 hours as needed for nausea or vomiting       tiZANidine (ZANAFLEX) 2 MG tablet   2     TiZANidine HCl 2 MG CAPS Take 2 mg by mouth every 6 hours as needed        VENTOLIN  (90 Base) MCG/ACT inhaler INHALE 2 PUFFS Q 4 H PRF WHEEZING  3     VICTOZA PEN 18 MG/3ML soln INJ 0.6 MG ONCE D FOR 2 WEEKS THEN 1.2 MG ONCE D FOR 2 WEEKS THEN 1.8 MG ONCE D  1     warfarin (COUMADIN) 5 MG tablet Take 5 mg by mouth daily 2.5 mg (1/2 tablet) Tu-Su, whole tablet on Monday.       zolpidem (AMBIEN) 5 MG tablet Take 5 mg by mouth nightly as needed            Allergies:      No Known Allergies     Social  "History:     Social History   Substance Use Topics     Smoking status: Former Smoker     Smokeless tobacco: Never Used      Comment: quite 40+ years ago      Alcohol use No      Comment: socially       History   Drug Use No         Family History:       Family History   Problem Relation Age of Onset     Adopted: Yes         Physical Exam:     /82  Pulse 103  Temp 98.6  F (37  C)  Ht 1.651 m (5' 5\")  Wt 122.7 kg (270 lb 6.4 oz)  SpO2 100%  BMI 45 kg/m2  Body mass index is 45 kg/(m^2).    General Appearance: healthy and alert, no distress     HEENT: no thyromegaly, no palpable nodules or masses        Cardiovascular: regular rate and rhythm, no gallops, rubs or murmurs     Respiratory: lungs clear, no rales, rhonchi or wheezes, normal diaphragmatic excursion    Musculoskeletal: extremities non tender and without edema    Skin: no lesions or rashes     Neurological: normal gait, no gross defects     Psychiatric: appropriate mood and affect                               Hematological: normal cervical, supraclavicular and inguinal lymph nodes     Gastrointestinal:       abdomen soft, obese, non-tender, non-distended, no organomegaly or masses    Genitourinary: External genitalia and urethral meatus appears normal.  Vagina is smooth without nodularity or masses.  Cervix surgically absent.  Bimanual exam reveal no masses, nodularity or fullness--exam limited from body habitus.  Recto-vaginal exam confirms these findings.      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  She is here today for a surveillance visit.      20 minutes were spent with this patient, over 50% of that time was spent in symptom management, treatment planning and in counseling and coordination of care.      Plan:     1.)        Patient to RTC in 6 months for her next surveillance visit. Reviewed signs and symptoms for when she should contact the clinic or seek " additional care. Patient to contact the clinic with any questions or concerns in the interim.     2.) Genetic risk factors were assessed and her MMR proteins were intact.     3.) Labs and/or tests ordered include:  None.      4.) Health maintenance issues addressed today include annual health maintenance and non-gynecologic issues with PCP.    NATHAN Sneed, WHNP-BC, ANP-BC  Women's Health Nurse Practitioner  Adult Nurse Pracitioner  Division of Gynecologic Oncology          CC  Patient Care Team:  Debra Wolfe NP as PCP - General (Nurse Practitioner)  SELF, REFERRED

## 2018-08-28 NOTE — LETTER
2018       RE: Yashira Son  [address]  [address]  [facility] [address]     Dear Colleague,    Thank you for referring your patient, Yashira Son, to the [facility]. Please see a copy of my visit note below.                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  She is here today for a surveillance visit.       HPI:  Ms. Son originally presented with post-menopausal bleeding in 2016 with atypical glandular cell pap in 2015. Biopsy revealed adenocarcinoma and the patient proceeded with subsequent robotic hysterectomy, BSO and cancer staging on 12/18/15. No malignancy seen at time of procedure and peritoneal surfaces and omentum were clear. Final pathology report showed positive washings, a 2.1 cm noninvasive high-grade serous endometrial cancer, and benign polyps. + fibroids. DNA mismatch repair intact showing this is not due to hoyos. ER receptor was 66% positive and [place] receptor was 38% positive --> stage IA high-grade serous endometrial cancer with positive washings. Patient underwent 6 cycles of chemotherapy, completed 16. Dose reduction of Taxol done during cycle #4 due to neuropathy of fingers and toes. CT scan was negative on 16. Other notable history includes: atrial fibrillation on coumadin, morbid obesity, adult-onset diabetes, asthma and hyperlipidemia.     12/18/15: DaVinci TLH/BSO/bilateral PPALND/omental biopsy with Dr. Brunner for high grade serous endometrial cancer.      PATH REREAD:A. UTERUS, FALLOPIAN TUBES AND OVARIES, HYSTERECTOMY AND BILATERAL   SALPINGO-OOPHORECTOMY:   - Endometrial serous carcinoma   - Maximum tumor size 2.1cm   - Myometrial invasion absent   - Lower uterine segment, cervix and uterine serosa - uninvolved   - Lymphovascular invasion -  absent   - Margins negative for tumor   - Ovary and fallopian tubes negative for carcinoma.   - Leiomyomas     B. LYMPH NODES, RIGHT PERIAORTIC, DISSECTION:   - Two lymph nodes negative for carcinoma (0/2)     C. LYMPH NODE, LEFT PERIAORTIC, DISSECTION:   - One lymph nodes negative for carcinoma (0/1)     D. LYMPH NODES, LEFT PELVIC, DISSECTION:   - Two lymph nodes negative for carcinoma (0/2)     E. LYMPH NODES, RIGHT PARA-AORTIC DISSECTION:   - Nine lymph nodes negative for carcinoma (0/9)     F. OMENTUM, OMENTECTOMY:   - Adipose tissue negative for carcinoma     COMMENT:   Tumor cells are focally positive for estrogen receptor (5%) and negative for progesterone receptor. DNA mismatch repair enzymes are intact. Immunostains performed in the outside institution are reviewed in-house.       CT C/A/P at OSH (6/2016): negative for masses except stable left lung nodule      She was lost to follow up until 8/2017.      Today she comes to clinic feeling well and denies any concerns for this visit. She denies any vaginal bleeding, no changes in her bowel or bladder habits, no nausea/emesis, no lower extremity edema, and no difficulties eating or sleeping. She denies any abdominal discomfort/bloating, no fevers or chills, and no chest pain or shortness of breath. She is current with her health screenings. She was recently placed on Victoza and reports her BG have been running about 88-the low 100's. She is not sexually active and denies any vaginal or vulvar dryness.          Review of Systems:    Systemic           no weight changes; no fever; no chills; no night sweats; no appetite changes  Skin           no rashes, or lesions  Eye           no irritation; no changes in vision  [place]-Laryngeal           no dysphagia; no hoarseness   Pulmonary    no cough; no shortness of breath  Cardiovascular    no chest pain; no palpitations  Gastrointestinal    no diarrhea; no constipation; no abdominal pain; no changes in bowel habits;  no blood in stool  Genitourinary   no urinary frequency; no urinary urgency; no dysuria; no pain; no abnormal vaginal discharge; no abnormal vaginal bleeding  Breast    no breast discharge; no breast changes; no breast pain  Musculoskeletal    no myalgias; no arthralgias; no back pain  Psychiatric           no depressed mood; no anxiety    Hematologic               no tender lymph nodes; no noticeable swellings or lumps   Endocrine    no hot flashes; no heat/cold intolerance         Neurological   no tremor; no numbness and tingling; no headaches; no difficulty sleeping      Past Medical History:    Past Medical History:   Diagnosis Date     Anxiety      Asthma      Atrial fibrillation (H)      Depression      Endometrial cancer (H)      Hyperlipidemia      RONNY (obstructive sleep apnea)      Prediabetes          Past Surgical History:    Past Surgical History:   Procedure Laterality Date     DAVINCI HYSTERECTOMY TOTAL, BILATERAL SALPINGO-OOPHORECTOMY, NODE DISSECTION, COMBINED       oophorectomy Right     XL, dermoid, in 1981     THYROIDECTOMY      subtotal         Health Maintenance Due   Topic Date Due     PHQ-2 Q1 YR  07/23/1955     TETANUS IMMUNIZATION (SYSTEM ASSIGNED)  07/23/1961     LIPID SCREEN Q5 YR FEMALE (SYSTEM ASSIGNED)  07/23/1988     ADVANCE DIRECTIVE PLANNING Q5 YRS  07/23/1998     FALL RISK ASSESSMENT  07/23/2008     DEXA SCAN SCREENING (SYSTEM ASSIGNED)  07/23/2008     PNEUMOCOCCAL (1 of 2 - PCV13) 07/23/2008       Current Medications:     Current Outpatient Prescriptions   Medication Sig Dispense Refill     ACCU-CHEK SHIRLEY PLUS test strip   1     alendronate (FOSAMAX) 35 MG tablet TK 1 T PO Q WEEK  2     alendronate (FOSAMAX) 70 MG tablet Take 70 mg by mouth once a week       atorvastatin (LIPITOR) 20 MG tablet Take 20 mg by mouth daily        B Complex-Biotin-FA (B-50 COMPLEX PO) Take by mouth daily        BD AMOS U/F 32G X 4 MM insulin pen needle USE UTD WITH VICTOZA PEN  2     blood glucose  monitoring (ACCU-CHEK SHIRLEY PLUS) meter device kit USE UTD  0     blood glucose monitoring (SOFTCLIX) lancets TK UTD  11     buPROPion (WELLBUTRIN XL) 150 MG 24 hr tablet TK 1 T PO QAM FOR DEPRESSION  3     calcium-magnesium (CALMAG) 500-250 MG TABS per tablet Take 1 tablet by mouth 2 times daily (with meals)       cholecalciferol (VITAMIN D3) 5000 UNITS TABS tablet Take 5,000 Units by mouth daily       DEXILANT 60 MG CPDR CR capsule TK ONE C PO QAM FOR GASTRIC REFLUX  1     dexlansoprazole (DEXILANT) 30 MG CPDR CR capsule Take 60 mg by mouth daily        escitalopram (LEXAPRO) 20 MG tablet Take 20 mg by mouth daily        fluticasone-salmeterol (ADVAIR DISKUS) 250-50 MCG/DOSE diskus inhaler Inhale 1 puff into the lungs 2 times daily        furosemide (LASIX) 20 MG tablet TK 2 TS PO D IF WEIGHT UP BY 3 POUNDS FROM BASELINE AND HAVE SWELLING IN LOWER EXTREMITIES. OTHERWISE TK 1 T D.  1     ibuprofen (ADVIL/MOTRIN) 800 MG tablet Take 800 mg by mouth 3 times daily as needed        lisinopril (PRINIVIL/ZESTRIL) 2.5 MG tablet TK 1 T PO QAM FOR KIDNEY PROTECTION  4     metFORMIN (GLUCOPHAGE) 500 MG tablet Take 500 mg by mouth 3 times daily (with meals)       metFORMIN (GLUCOPHAGE-XR) 500 MG 24 hr tablet TK 3 TS PO D WF  5     metoprolol (LOPRESSOR) 25 MG tablet Take 25 mg by mouth 2 times daily        montelukast (SINGULAIR) 10 MG tablet Take 10 mg by mouth At Bedtime       ondansetron (ZOFRAN) 4 MG tablet TK 1 T PO Q 8 H PRF NAUSEA  5     oxybutynin (DITROPAN-XL) 10 MG 24 hr tablet Take 10 mg by mouth daily        PROCHLORPERAZINE MALEATE PO Take 10 mg by mouth every 6 hours as needed for nausea or vomiting       tiZANidine (ZANAFLEX) 2 MG tablet   2     TiZANidine HCl 2 MG CAPS Take 2 mg by mouth every 6 hours as needed        VENTOLIN  (90 Base) MCG/ACT inhaler INHALE 2 PUFFS Q 4 H PRF WHEEZING  3     VICTOZA PEN 18 MG/3ML soln INJ 0.6 MG ONCE D FOR 2 WEEKS THEN 1.2 MG ONCE D FOR 2 WEEKS THEN 1.8 MG ONCE D  1  "    warfarin (COUMADIN) 5 MG tablet Take 5 mg by mouth daily 2.5 mg (1/2 tablet) Tu-Su, whole tablet on Monday.       zolpidem (AMBIEN) 5 MG tablet Take 5 mg by mouth nightly as needed            Allergies:      No Known Allergies     Social History:     Social History   Substance Use Topics     Smoking status: Former Smoker     Smokeless tobacco: Never Used      Comment: quite 40+ years ago      Alcohol use No      Comment: socially       History   Drug Use No         Family History:       Family History   Problem Relation Age of Onset     Adopted: Yes         Physical Exam:     /82  Pulse 103  Temp 98.6  F (37  C)  Ht 1.651 m (5' 5\")  Wt 122.7 kg (270 lb 6.4 oz)  SpO2 100%  BMI 45 kg/m2  Body mass index is 45 kg/(m^2).    General Appearance: healthy and alert, no distress     HEENT: no thyromegaly, no palpable nodules or masses        Cardiovascular: regular rate and rhythm, no gallops, rubs or murmurs     Respiratory: lungs clear, no rales, rhonchi or wheezes, normal diaphragmatic excursion    Musculoskeletal: extremities non tender and without edema    Skin: no lesions or rashes     Neurological: normal gait, no gross defects     Psychiatric: appropriate mood and affect                               Hematological: normal cervical, supraclavicular and inguinal lymph nodes     Gastrointestinal:       abdomen soft, obese, non-tender, non-distended, no organomegaly or masses    Genitourinary: External genitalia and urethral meatus appears normal.  Vagina is smooth without nodularity or masses.  Cervix surgically absent.  Bimanual exam reveal no masses, nodularity or fullness--exam limited from body habitus.  Recto-vaginal exam confirms these findings.      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  She is here today for a surveillance visit.      20 minutes were spent with this patient, over 50% of that time was spent in " symptom management, treatment planning and in counseling and coordination of care.      Plan:     1.)        Patient to RTC in 6 months for her next surveillance visit. Reviewed signs and symptoms for when she should contact the clinic or seek additional care. Patient to contact the clinic with any questions or concerns in the interim.     2.) Genetic risk factors were assessed and her MMR proteins were intact.     3.) Labs and/or tests ordered include:  None.      4.) Health maintenance issues addressed today include annual health maintenance and non-gynecologic issues with PCP.    NATHAN Sneed, WHNP-BC, ANP-BC  Women's Health Nurse Practitioner  Adult Nurse Pracitioner  Division of Gynecologic Oncology          CC  Patient Care Team:  Debra Wolfe NP as PCP - General (Nurse Practitioner)

## 2018-08-28 NOTE — MR AVS SNAPSHOT
"              After Visit Summary   8/28/2018    Yashira Son    MRN: [id number]           Patient Information     Date Of Birth          1945        Visit Information        Provider Department      8/28/2018 2:00 PM Adrianna Hendricks APRN CNP [facility]        Today's Diagnoses     Endometrial cancer (H)    -  1    Morbid obesity (H)           Follow-ups after your visit        Follow-up notes from your care team     Return in about 6 months (around 2/28/2019).      Your next 10 appointments already scheduled     Mar 26, 2019  2:00 PM CDT   (Arrive by 1:45 PM)   Return Visit with NATHAN James CNP   [facility] ([facility])    [address]  [address]  [place] [address] [phone]              Who to contact     If you have questions or need follow up information about today's clinic visit or your schedule please contact [facility] directly at [phone].  Normal or non-critical lab and imaging results will be communicated to you by Grazehart, letter or phone within 4 business days after the clinic has received the results. If you do not hear from us within 7 days, please contact the clinic through Grazehart or phone. If you have a critical or abnormal lab result, we will notify you by phone as soon as possible.  Submit refill requests through [facility] or call your pharmacy and they will forward the refill request to us. Please allow 3 business days for your refill to be completed.          Additional Information About Your Visit        Grazehart Information     [facility] lets you send messages to your doctor, view your test results, renew your prescriptions, schedule appointments and more. To sign up, go to [email or web address] . Click on \"Log in\" on the left side of the screen, which will take you to the Welcome page. Then click on \"Sign up Now\" on the right side of the page.     You " "will be asked to enter the access code listed below, as well as some personal information. Please follow the directions to create your username and password.     Your access code is: [email or web address]  Expires: 2018  6:30 AM     Your access code will  in 90 days. If you need help or a new code, please call your [place] clinic or [phone].        Care EveryWhere ID     This is your Care EveryWhere ID. This could be used by other organizations to access your [place] medical records  [id number]        Your Vitals Were     Pulse Temperature Height Pulse Oximetry BMI (Body Mass Index)       103 98.6  F (37  C) 1.651 m (5' 5\") 100% 45 kg/m2        Blood Pressure from Last 3 Encounters:   18 119/82   18 114/77   17 115/85    Weight from Last 3 Encounters:   18 122.7 kg (270 lb 6.4 oz)   18 123.6 kg (272 lb 6.4 oz)   17 124.6 kg (274 lb 9.6 oz)              Today, you had the following     No orders found for display       Primary Care Provider Office Phone # Fax #    Debra Wolfe [phone]        [facility] [address] [address]        Equal Access to Services     INGRID ESTRADA : Hadii domitila patterson hadasho Sosurjit, waaxda luqadaha, qaybta kaalmada adexenia, dannielle bell. So [facility] [phone].    ATENCIÓN: Si habla español, tiene a pichardo disposición servicios gratuitos de asistencia lingüística. Nga al [phone].    We comply with applicable federal civil rights laws and [place] laws. We do not discriminate on the basis of race, color, national origin, age, disability, sex, sexual orientation, or gender identity.            Thank you!     Thank you for choosing [facility]  for your care. Our goal is always to provide you with excellent care. Hearing back from our patients is one way we can continue to improve our services. Please take a few minutes to complete the written " survey that you may receive in the mail after your visit with us. Thank you!             Your Updated Medication List - Protect others around you: Learn how to safely use, store and throw away your medicines at www.disposemymeds.org.          This list is accurate as of 8/28/18  2:12 PM.  Always use your most recent med list.                   Brand Name Dispense Instructions for use Diagnosis    ACCU-CHEK SHIRLEY PLUS test strip   Generic drug:  blood glucose monitoring           ADVAIR DISKUS 250-50 MCG/DOSE diskus inhaler   Generic drug:  fluticasone-salmeterol      Inhale 1 puff into the lungs 2 times daily        * alendronate 70 MG tablet    FOSAMAX     Take 70 mg by mouth once a week        * alendronate 35 MG tablet    FOSAMAX     TK 1 T PO Q WEEK        atorvastatin 20 MG tablet    LIPITOR     Take 20 mg by mouth daily        B-50 COMPLEX PO      Take by mouth daily        BD AMOS U/F 32G X 4 MM   Generic drug:  insulin pen needle      USE UTD WITH VICTOZA PEN        blood glucose monitoring lancets      TK UTD        blood glucose monitoring meter device kit      USE UTD        buPROPion 150 MG 24 hr tablet    WELLBUTRIN XL     TK 1 T PO QAM FOR DEPRESSION        calcium-magnesium 500-250 MG Tabs per tablet    CALMAG     Take 1 tablet by mouth 2 times daily (with meals)        cholecalciferol 5000 units Tabs tablet    vitamin D3     Take 5,000 Units by mouth daily        * dexlansoprazole 30 MG Cpdr CR capsule    DEXILANT     Take 60 mg by mouth daily        * DEXILANT 60 MG Cpdr CR capsule   Generic drug:  dexlansoprazole      TK ONE C PO QAM FOR GASTRIC REFLUX        escitalopram 20 MG tablet    LEXAPRO     Take 20 mg by mouth daily        furosemide 20 MG tablet    LASIX     TK 2 TS PO D IF WEIGHT UP BY 3 POUNDS FROM BASELINE AND HAVE SWELLING IN LOWER EXTREMITIES. OTHERWISE TK 1 T D.        ibuprofen 800 MG tablet    ADVIL/MOTRIN     Take 800 mg by mouth 3 times daily as needed        lisinopril 2.5 MG  tablet    PRINIVIL/Zestril     TK 1 T PO QAM FOR KIDNEY PROTECTION        * metFORMIN 500 MG tablet    GLUCOPHAGE     Take 500 mg by mouth 3 times daily (with meals)        * metFORMIN 500 MG 24 hr tablet    GLUCOPHAGE-XR     TK 3 TS PO D WF        metoprolol tartrate 25 MG tablet    LOPRESSOR     Take 25 mg by mouth 2 times daily        montelukast 10 MG tablet    SINGULAIR     Take 10 mg by mouth At Bedtime        ondansetron 4 MG tablet    ZOFRAN     TK 1 T PO Q 8 H PRF NAUSEA        oxybutynin 10 MG 24 hr tablet    DITROPAN-XL     Take 10 mg by mouth daily        PROCHLORPERAZINE MALEATE PO      Take 10 mg by mouth every 6 hours as needed for nausea or vomiting        * TiZANidine HCl 2 MG Caps      Take 2 mg by mouth every 6 hours as needed        * tiZANidine 2 MG tablet    ZANAFLEX          VENTOLIN  (90 Base) MCG/ACT inhaler   Generic drug:  albuterol      INHALE 2 PUFFS Q 4 H PRF WHEEZING        VICTOZA PEN 18 MG/3ML soln   Generic drug:  liraglutide      INJ 0.6 MG ONCE D FOR 2 WEEKS THEN 1.2 MG ONCE D FOR 2 WEEKS THEN 1.8 MG ONCE D        warfarin 5 MG tablet    COUMADIN     Take 5 mg by mouth daily 2.5 mg (1/2 tablet) Tu-Su, whole tablet on Monday.        zolpidem 5 MG tablet    AMBIEN     Take 5 mg by mouth nightly as needed        * Notice:  This list has 8 medication(s) that are the same as other medications prescribed for you. Read the directions carefully, and ask your doctor or other care provider to review them with you.

## 2018-12-17 ENCOUNTER — TELEPHONE (OUTPATIENT)
Dept: ONCOLOGY | Facility: CLINIC | Age: 73
End: 2018-12-17

## 2019-03-26 ENCOUNTER — ONCOLOGY VISIT (OUTPATIENT)
Dept: ONCOLOGY | Facility: CLINIC | Age: 74
End: 2019-03-26
Attending: NURSE PRACTITIONER
Payer: MEDICARE

## 2019-03-26 VITALS
WEIGHT: 258 LBS | HEART RATE: 95 BPM | TEMPERATURE: 98.4 F | BODY MASS INDEX: 42.93 KG/M2 | OXYGEN SATURATION: 95 % | DIASTOLIC BLOOD PRESSURE: 74 MMHG | SYSTOLIC BLOOD PRESSURE: 102 MMHG

## 2019-03-26 DIAGNOSIS — C54.1 ENDOMETRIAL CANCER (H): Primary | ICD-10-CM

## 2019-03-26 PROCEDURE — 99213 OFFICE O/P EST LOW 20 MIN: CPT | Mod: ZP | Performed by: NURSE PRACTITIONER

## 2019-03-26 PROCEDURE — G0463 HOSPITAL OUTPT CLINIC VISIT: HCPCS

## 2019-03-26 ASSESSMENT — PAIN SCALES - GENERAL: PAINLEVEL: NO PAIN (0)

## 2019-03-26 NOTE — NURSING NOTE
"Oncology Rooming Note    March 26, 2019 12:49 PM   Yashira Son is a 73 year old female who presents for:    Chief Complaint   Patient presents with     Oncology Clinic Visit     Endometrial Ca; 6 month return     Initial Vitals: /74   Pulse 95   Temp 98.4  F (36.9  C) (Oral)   Wt 117 kg (258 lb)   SpO2 95%   BMI 42.93 kg/m   Estimated body mass index is 42.93 kg/m  as calculated from the following:    Height as of 8/28/18: 1.651 m (5' 5\").    Weight as of this encounter: 117 kg (258 lb). Body surface area is 2.32 meters squared.  No Pain (0) Comment: Data Unavailable   No LMP recorded. Patient has had a hysterectomy.  Allergies reviewed: Yes  Medications reviewed: Yes    Medications: Medication refills not needed today.  Pharmacy name entered into [employer]: [facility] [id number] [address] AT [address] [place] [address]    Clinical concerns:  Patient denies pelvic and vaginal pain at today's visit.        Jessie Smith CMA              "

## 2019-03-26 NOTE — LETTER
3/26/2019       RE: Yashira Son  [address]  [address]  [place] [address]     Dear Colleague,    Thank you for referring your patient, Yashira Son, to the [facility]. Please see a copy of my visit note below.                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 She completed chemotherapy 2016.  She is here today for a surveillance visit.       HPI:  Ms. Son originally presented with post-menopausal bleeding in 2016 with atypical glandular cell pap in 2015. Biopsy revealed adenocarcinoma and the patient proceeded with subsequent robotic hysterectomy, BSO and cancer staging on 12/18/15. No malignancy seen at time of procedure and peritoneal surfaces and omentum were clear. Final pathology report showed positive washings, a 2.1 cm noninvasive high-grade serous endometrial cancer, and benign polyps. + fibroids. DNA mismatch repair intact showing this is not due to hoyos. ER receptor was 66% positive and IN receptor was 38% positive --> stage IA high-grade serous endometrial cancer with positive washings. Patient underwent 6 cycles of chemotherapy, completed 16. Dose reduction of Taxol done during cycle #4 due to neuropathy of fingers and toes. CT scan was negative on 16. Other notable history includes: atrial fibrillation on coumadin, morbid obesity, adult-onset diabetes, asthma and hyperlipidemia.     12/18/15: [facility] TLH/BSO/bilateral PPALND/omental biopsy with Dr. Brunner for high grade serous endometrial cancer.      PATH REREAD:A. UTERUS, FALLOPIAN TUBES AND OVARIES, HYSTERECTOMY AND BILATERAL   SALPINGO-OOPHORECTOMY:   - Endometrial serous carcinoma   - Maximum tumor size 2.1cm   - Myometrial invasion absent   - Lower uterine segment, cervix and uterine serosa -  "uninvolved   - Lymphovascular invasion - absent   - Margins negative for tumor   - Ovary and fallopian tubes negative for carcinoma.   - Leiomyomas     B. LYMPH NODES, RIGHT PERIAORTIC, DISSECTION:   - Two lymph nodes negative for carcinoma (0/2)     C. LYMPH NODE, LEFT PERIAORTIC, DISSECTION:   - One lymph nodes negative for carcinoma (0/1)     D. LYMPH NODES, LEFT PELVIC, DISSECTION:   - Two lymph nodes negative for carcinoma (0/2)     E. LYMPH NODES, RIGHT PARA-AORTIC DISSECTION:   - Nine lymph nodes negative for carcinoma (0/9)     F. OMENTUM, OMENTECTOMY:   - Adipose tissue negative for carcinoma     COMMENT:   Tumor cells are focally positive for estrogen receptor (5%) and negative for progesterone receptor. DNA mismatch repair enzymes are intact. Immunostains performed in the outside institution are reviewed in-house.       CT C/A/P at OSH (6/2016): negative for masses except stable left lung nodule      She was lost to follow up until 8/2017.          Today she comes to clinic and denies any concerns for her visit. She denies any vaginal bleeding, no changes in her bowel or bladder habits, no nausea/emesis, no lower extremity edema, and no difficulties eating or sleeping. She denies any abdominal discomfort/bloating, no fevers or chills, and no chest pain or shortness of breath but will have some PRESTON at times. She is current with her health screenings and is not sexually active. She does not check her BG at home as her Hgb A1c have been \"really good\".       Review of Systems:    Systemic           no weight changes; no fever; no chills; no night sweats; no appetite changes  Skin           no rashes, or lesions  Eye           no irritation; no changes in vision  [place]-Laryngeal           no dysphagia; no hoarseness   Pulmonary    no cough; no shortness of breath  Cardiovascular    no chest pain; no palpitations; + HTN  Gastrointestinal    no diarrhea; no constipation; no abdominal pain; no changes in bowel " habits; no blood in stool  Genitourinary   no urinary frequency; no urinary urgency; no dysuria; no pain; no abnormal vaginal discharge; no abnormal vaginal bleeding  Breast    no breast discharge; no breast changes; no breast pain  Musculoskeletal    no myalgias; no arthralgias; no back pain  Psychiatric           no depressed mood; no anxiety    Hematologic              no tender lymph nodes; no noticeable swellings or lumps   Endocrine    no hot flashes; no heat/cold intolerance; + DM         Neurological   no tremor; + numbness and tingling; no headaches; no difficulty sleeping      Past Medical History:    Past Medical History:   Diagnosis Date     Anxiety      Asthma      Atrial fibrillation (H)      Depression      Endometrial cancer (H)      Hyperlipidemia      RONNY (obstructive sleep apnea)      Prediabetes          Past Surgical History:    Past Surgical History:   Procedure Laterality Date     DAVINCI HYSTERECTOMY TOTAL, BILATERAL SALPINGO-OOPHORECTOMY, NODE DISSECTION, COMBINED       oophorectomy Right     XL, dermoid, in 1981     THYROIDECTOMY      subtotal         Health Maintenance Due   Topic Date Due     PHQ-2 Q1 YR  07/23/1955     DTAP/TDAP/TD IMMUNIZATION (1 - Tdap) 07/23/1968     MAMMO SCREEN Q2 YR (SYSTEM ASSIGNED)  07/23/1983     LIPID SCREEN Q5 YR FEMALE (SYSTEM ASSIGNED)  07/23/1988     ZOSTER IMMUNIZATION (1 of 2) 07/23/1993     ADVANCE DIRECTIVE PLANNING Q5 YRS  07/23/1998     MEDICARE ANNUAL WELLNESS VISIT  07/23/2008     FALL RISK ASSESSMENT  07/23/2008     DEXA SCAN SCREENING (SYSTEM ASSIGNED)  07/23/2008       Current Medications:     Current Outpatient Medications   Medication Sig Dispense Refill     ACCU-CHEK SHIRLEY PLUS test strip   1     alendronate (FOSAMAX) 35 MG tablet TK 1 T PO Q WEEK  2     atorvastatin (LIPITOR) 20 MG tablet Take 20 mg by mouth daily        B Complex-Biotin-FA (B-50 COMPLEX PO) Take by mouth daily        blood glucose monitoring (ACCU-CHEK SHIRLEY PLUS) meter  device kit USE UTD  0     blood glucose monitoring (SOFTCLIX) lancets TK UTD  11     buPROPion (WELLBUTRIN XL) 150 MG 24 hr tablet TK 1 T PO QAM FOR DEPRESSION  3     calcium-magnesium (CALMAG) 500-250 MG TABS per tablet Take 1 tablet by mouth 2 times daily (with meals)       cholecalciferol (VITAMIN D3) 5000 UNITS TABS tablet Take 5,000 Units by mouth daily       DEXILANT 60 MG CPDR CR capsule TK ONE C PO QAM FOR GASTRIC REFLUX  1     escitalopram (LEXAPRO) 20 MG tablet Take 20 mg by mouth daily        fluticasone-salmeterol (ADVAIR DISKUS) 250-50 MCG/DOSE diskus inhaler Inhale 1 puff into the lungs 2 times daily        ibuprofen (ADVIL/MOTRIN) 800 MG tablet Take 800 mg by mouth 3 times daily as needed        metFORMIN (GLUCOPHAGE) 500 MG tablet Take 500 mg by mouth 2 times daily (with meals)        metoprolol (LOPRESSOR) 25 MG tablet Take 25 mg by mouth 2 times daily        montelukast (SINGULAIR) 10 MG tablet Take 10 mg by mouth At Bedtime       oxybutynin (DITROPAN-XL) 10 MG 24 hr tablet Take 10 mg by mouth daily        TiZANidine HCl 2 MG CAPS Take 2 mg by mouth every 6 hours as needed        VICTOZA PEN 18 MG/3ML soln INJ 0.6 MG ONCE D FOR 2 WEEKS THEN 1.2 MG ONCE D FOR 2 WEEKS THEN 1.8 MG ONCE D  1     warfarin (COUMADIN) 5 MG tablet Take 5 mg by mouth daily 2.5 mg (1/2 tablet) Tu-Su, whole tablet on Monday.       alendronate (FOSAMAX) 70 MG tablet Take 70 mg by mouth once a week       BD AMOS U/F 32G X 4 MM insulin pen needle USE UTD WITH VICTOZA PEN  2     dexlansoprazole (DEXILANT) 30 MG CPDR CR capsule Take 60 mg by mouth daily        furosemide (LASIX) 20 MG tablet TK 2 TS PO D IF WEIGHT UP BY 3 POUNDS FROM BASELINE AND HAVE SWELLING IN LOWER EXTREMITIES. OTHERWISE TK 1 T D.  1     lisinopril (PRINIVIL/ZESTRIL) 2.5 MG tablet TK 1 T PO QAM FOR KIDNEY PROTECTION  4     metFORMIN (GLUCOPHAGE-XR) 500 MG 24 hr tablet TK 3 TS PO D WF  5     ondansetron (ZOFRAN) 4 MG tablet TK 1 T PO Q 8 H PRF NAUSEA  5      PROCHLORPERAZINE MALEATE PO Take 10 mg by mouth every 6 hours as needed for nausea or vomiting       tiZANidine (ZANAFLEX) 2 MG tablet   2     VENTOLIN  (90 Base) MCG/ACT inhaler INHALE 2 PUFFS Q 4 H PRF WHEEZING  3     zolpidem (AMBIEN) 5 MG tablet Take 5 mg by mouth nightly as needed            Allergies:      No Known Allergies     Social History:     Social History     Tobacco Use     Smoking status: Former Smoker     Smokeless tobacco: Never Used     Tobacco comment: quite 40+ years ago    Substance Use Topics     Alcohol use: No     Comment: socially       History   Drug Use No         Family History:       Family History   Adopted: Yes         Physical Exam:     /74   Pulse 95   Temp 98.4  F (36.9  C) (Oral)   Wt 117 kg (258 lb)   SpO2 95%   BMI 42.93 kg/m     Body mass index is 42.93 kg/m .    General Appearance: healthy and alert, no distress     HEENT: no thyromegaly, no palpable nodules or masses        Cardiovascular: regular rate and rhythm, no gallops, rubs or murmurs     Respiratory: lungs clear, no rales, rhonchi or wheezes, normal diaphragmatic excursion    Musculoskeletal: extremities non tender and without edema    Skin: no lesions or rashes     Neurological: normal gait, no gross defects     Psychiatric: appropriate mood and affect                               Hematological: normal cervical, supraclavicular and inguinal lymph nodes     Gastrointestinal:       abdomen soft, obese, non-tender, non-distended, no organomegaly or masses    Genitourinary: External genitalia and urethral meatus appears normal.  Vagina is smooth without nodularity or masses.  Cervix surgically absent. Bimanual exam reveal no masses, nodularity or fullness--limited exam secondary to body habitus.  Recto-vaginal exam confirms these findings.      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 She completed chemotherapy 5/2016.   She is here today for a surveillance visit.      20 minutes were spent with this patient, over 50% of that time was spent in symptom management, treatment planning and in counseling and coordination of care.      Plan:     1.)        Patient to RTC in 6 months for her next surveillance visit. Reviewed recommendations from [facility] not to perform surveillance pap smears in women diagnosed with endometrial cancer as this does not improve detection of local recurrence. Reviewed signs and symptoms for when she should contact the clinic or seek additional care. Patient to contact the clinic with any questions or concerns in the interim.     2.) Genetic risk factors were assessed and the patient does not meet the qualifications for a referral.      3.) Labs and/or tests ordered include: none.      4.) Health maintenance issues addressed today include annual health maintenance and non-gynecologic issues with PCP.    NATHAN Sneed, WHNP-BC, ANP-BC  Women's Health Nurse Practitioner  Adult Nurse Pracitioner  Division of Gynecologic Oncology          CC  Patient Care Team:  Debra Wolfe NP as PCP - General (Nurse Practitioner)  SELF, REFERRED    Again, thank you for allowing me to participate in the care of your patient.      Sincerely,    NATHAN James CNP

## 2019-03-26 NOTE — PROGRESS NOTES
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 She completed chemotherapy 2016.  She is here today for a surveillance visit.       HPI:  Ms. Son originally presented with post-menopausal bleeding in 2016 with atypical glandular cell pap in 2015. Biopsy revealed adenocarcinoma and the patient proceeded with subsequent robotic hysterectomy, BSO and cancer staging on 12/18/15. No malignancy seen at time of procedure and peritoneal surfaces and omentum were clear. Final pathology report showed positive washings, a 2.1 cm noninvasive high-grade serous endometrial cancer, and benign polyps. + fibroids. DNA mismatch repair intact showing this is not due to hoyos. ER receptor was 66% positive and MO receptor was 38% positive --> stage IA high-grade serous endometrial cancer with positive washings. Patient underwent 6 cycles of chemotherapy, completed 16. Dose reduction of Taxol done during cycle #4 due to neuropathy of fingers and toes. CT scan was negative on 16. Other notable history includes: atrial fibrillation on coumadin, morbid obesity, adult-onset diabetes, asthma and hyperlipidemia.     12/18/15: DaVinci TLH/BSO/bilateral PPALND/omental biopsy with Dr. Brunner for high grade serous endometrial cancer.      PATH REREAD:A. UTERUS, FALLOPIAN TUBES AND OVARIES, HYSTERECTOMY AND BILATERAL   SALPINGO-OOPHORECTOMY:   - Endometrial serous carcinoma   - Maximum tumor size 2.1cm   - Myometrial invasion absent   - Lower uterine segment, cervix and uterine serosa - uninvolved   - Lymphovascular invasion - absent   - Margins negative for tumor   - Ovary and fallopian tubes negative for carcinoma.   - Leiomyomas     B. LYMPH NODES, RIGHT PERIAORTIC, DISSECTION:   - Two lymph nodes negative for carcinoma (0/2)  "    C. LYMPH NODE, LEFT PERIAORTIC, DISSECTION:   - One lymph nodes negative for carcinoma (0/1)     D. LYMPH NODES, LEFT PELVIC, DISSECTION:   - Two lymph nodes negative for carcinoma (0/2)     E. LYMPH NODES, RIGHT PARA-AORTIC DISSECTION:   - Nine lymph nodes negative for carcinoma (0/9)     F. OMENTUM, OMENTECTOMY:   - Adipose tissue negative for carcinoma     COMMENT:   Tumor cells are focally positive for estrogen receptor (5%) and negative for progesterone receptor. DNA mismatch repair enzymes are intact. Immunostains performed in the outside institution are reviewed in-house.       CT C/A/P at OSH (6/2016): negative for masses except stable left lung nodule      She was lost to follow up until 8/2017.          Today she comes to clinic and denies any concerns for her visit. She denies any vaginal bleeding, no changes in her bowel or bladder habits, no nausea/emesis, no lower extremity edema, and no difficulties eating or sleeping. She denies any abdominal discomfort/bloating, no fevers or chills, and no chest pain or shortness of breath but will have some PRESTON at times. She is current with her health screenings and is not sexually active. She does not check her BG at home as her Hgb A1c have been \"really good\".       Review of Systems:    Systemic           no weight changes; no fever; no chills; no night sweats; no appetite changes  Skin           no rashes, or lesions  Eye           no irritation; no changes in vision  [place]-Laryngeal           no dysphagia; no hoarseness   Pulmonary    no cough; no shortness of breath  Cardiovascular    no chest pain; no palpitations; + HTN  Gastrointestinal    no diarrhea; no constipation; no abdominal pain; no changes in bowel habits; no blood in stool  Genitourinary   no urinary frequency; no urinary urgency; no dysuria; no pain; no abnormal vaginal discharge; no abnormal vaginal bleeding  Breast    no breast discharge; no breast changes; no breast pain  Musculoskeletal "    no myalgias; no arthralgias; no back pain  Psychiatric           no depressed mood; no anxiety    Hematologic              no tender lymph nodes; no noticeable swellings or lumps   Endocrine    no hot flashes; no heat/cold intolerance; + DM         Neurological   no tremor; + numbness and tingling; no headaches; no difficulty sleeping      Past Medical History:    Past Medical History:   Diagnosis Date     Anxiety      Asthma      Atrial fibrillation (H)      Depression      Endometrial cancer (H)      Hyperlipidemia      RONNY (obstructive sleep apnea)      Prediabetes          Past Surgical History:    Past Surgical History:   Procedure Laterality Date     DAVINCI HYSTERECTOMY TOTAL, BILATERAL SALPINGO-OOPHORECTOMY, NODE DISSECTION, COMBINED       oophorectomy Right     XL, dermoid, in 1981     THYROIDECTOMY      subtotal         Health Maintenance Due   Topic Date Due     PHQ-2 Q1 YR  07/23/1955     DTAP/TDAP/TD IMMUNIZATION (1 - Tdap) 07/23/1968     MAMMO SCREEN Q2 YR (SYSTEM ASSIGNED)  07/23/1983     LIPID SCREEN Q5 YR FEMALE (SYSTEM ASSIGNED)  07/23/1988     ZOSTER IMMUNIZATION (1 of 2) 07/23/1993     ADVANCE DIRECTIVE PLANNING Q5 YRS  07/23/1998     MEDICARE ANNUAL WELLNESS VISIT  07/23/2008     FALL RISK ASSESSMENT  07/23/2008     DEXA SCAN SCREENING (SYSTEM ASSIGNED)  07/23/2008       Current Medications:     Current Outpatient Medications   Medication Sig Dispense Refill     ACCU-CHEK SHIRLEY PLUS test strip   1     alendronate (FOSAMAX) 35 MG tablet TK 1 T PO Q WEEK  2     atorvastatin (LIPITOR) 20 MG tablet Take 20 mg by mouth daily        B Complex-Biotin-FA (B-50 COMPLEX PO) Take by mouth daily        blood glucose monitoring (ACCU-CHEK SHIRLEY PLUS) meter device kit USE UTD  0     blood glucose monitoring (SOFTCLIX) lancets TK UTD  11     buPROPion (WELLBUTRIN XL) 150 MG 24 hr tablet TK 1 T PO QAM FOR DEPRESSION  3     calcium-magnesium (CALMAG) 500-250 MG TABS per tablet Take 1 tablet by mouth 2 times  daily (with meals)       cholecalciferol (VITAMIN D3) 5000 UNITS TABS tablet Take 5,000 Units by mouth daily       DEXILANT 60 MG CPDR CR capsule TK ONE C PO QAM FOR GASTRIC REFLUX  1     escitalopram (LEXAPRO) 20 MG tablet Take 20 mg by mouth daily        fluticasone-salmeterol (ADVAIR DISKUS) 250-50 MCG/DOSE diskus inhaler Inhale 1 puff into the lungs 2 times daily        ibuprofen (ADVIL/MOTRIN) 800 MG tablet Take 800 mg by mouth 3 times daily as needed        metFORMIN (GLUCOPHAGE) 500 MG tablet Take 500 mg by mouth 2 times daily (with meals)        metoprolol (LOPRESSOR) 25 MG tablet Take 25 mg by mouth 2 times daily        montelukast (SINGULAIR) 10 MG tablet Take 10 mg by mouth At Bedtime       oxybutynin (DITROPAN-XL) 10 MG 24 hr tablet Take 10 mg by mouth daily        TiZANidine HCl 2 MG CAPS Take 2 mg by mouth every 6 hours as needed        VICTOZA PEN 18 MG/3ML soln INJ 0.6 MG ONCE D FOR 2 WEEKS THEN 1.2 MG ONCE D FOR 2 WEEKS THEN 1.8 MG ONCE D  1     warfarin (COUMADIN) 5 MG tablet Take 5 mg by mouth daily 2.5 mg (1/2 tablet) Tu-Su, whole tablet on Monday.       alendronate (FOSAMAX) 70 MG tablet Take 70 mg by mouth once a week       BD AMOS U/F 32G X 4 MM insulin pen needle USE UTD WITH VICTOZA PEN  2     dexlansoprazole (DEXILANT) 30 MG CPDR CR capsule Take 60 mg by mouth daily        furosemide (LASIX) 20 MG tablet TK 2 TS PO D IF WEIGHT UP BY 3 POUNDS FROM BASELINE AND HAVE SWELLING IN LOWER EXTREMITIES. OTHERWISE TK 1 T D.  1     lisinopril (PRINIVIL/ZESTRIL) 2.5 MG tablet TK 1 T PO QAM FOR KIDNEY PROTECTION  4     metFORMIN (GLUCOPHAGE-XR) 500 MG 24 hr tablet TK 3 TS PO D WF  5     ondansetron (ZOFRAN) 4 MG tablet TK 1 T PO Q 8 H PRF NAUSEA  5     PROCHLORPERAZINE MALEATE PO Take 10 mg by mouth every 6 hours as needed for nausea or vomiting       tiZANidine (ZANAFLEX) 2 MG tablet   2     VENTOLIN  (90 Base) MCG/ACT inhaler INHALE 2 PUFFS Q 4 H PRF WHEEZING  3     zolpidem (AMBIEN) 5 MG  tablet Take 5 mg by mouth nightly as needed            Allergies:      No Known Allergies     Social History:     Social History     Tobacco Use     Smoking status: Former Smoker     Smokeless tobacco: Never Used     Tobacco comment: quite 40+ years ago    Substance Use Topics     Alcohol use: No     Comment: socially       History   Drug Use No         Family History:       Family History   Adopted: Yes         Physical Exam:     /74   Pulse 95   Temp 98.4  F (36.9  C) (Oral)   Wt 117 kg (258 lb)   SpO2 95%   BMI 42.93 kg/m    Body mass index is 42.93 kg/m .    General Appearance: healthy and alert, no distress     HEENT: no thyromegaly, no palpable nodules or masses        Cardiovascular: regular rate and rhythm, no gallops, rubs or murmurs     Respiratory: lungs clear, no rales, rhonchi or wheezes, normal diaphragmatic excursion    Musculoskeletal: extremities non tender and without edema    Skin: no lesions or rashes     Neurological: normal gait, no gross defects     Psychiatric: appropriate mood and affect                               Hematological: normal cervical, supraclavicular and inguinal lymph nodes     Gastrointestinal:       abdomen soft, obese, non-tender, non-distended, no organomegaly or masses    Genitourinary: External genitalia and urethral meatus appears normal.  Vagina is smooth without nodularity or masses.  Cervix surgically absent. Bimanual exam reveal no masses, nodularity or fullness--limited exam secondary to body habitus.  Recto-vaginal exam confirms these findings.      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 She completed chemotherapy 5/2016.  She is here today for a surveillance visit.      20 minutes were spent with this patient, over 50% of that time was spent in symptom management, treatment planning and in counseling and coordination of care.      Plan:     1.)        Patient to RTC in 6  months for her next surveillance visit. Reviewed recommendations from [facility] not to perform surveillance pap smears in women diagnosed with endometrial cancer as this does not improve detection of local recurrence. Reviewed signs and symptoms for when she should contact the clinic or seek additional care. Patient to contact the clinic with any questions or concerns in the interim.     2.) Genetic risk factors were assessed and the patient does not meet the qualifications for a referral.      3.) Labs and/or tests ordered include: none.      4.) Health maintenance issues addressed today include annual health maintenance and non-gynecologic issues with PCP.    NATHAN Sneed, WHNP-BC, ANP-BC  Women's Health Nurse Practitioner  Adult Nurse Pracitioner  Division of Gynecologic Oncology          CC  Patient Care Team:  Debra Wolfe NP as PCP - General (Nurse Practitioner)  SELF, REFERRED

## 2019-10-02 NOTE — PROGRESS NOTES
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 She completed chemotherapy 2016.  She is here today for a surveillance visit.       HPI:  Ms. Son originally presented with post-menopausal bleeding in 2016 with atypical glandular cell pap in 2015. Biopsy revealed adenocarcinoma and the patient proceeded with subsequent robotic hysterectomy, BSO and cancer staging on 12/18/15. No malignancy seen at time of procedure and peritoneal surfaces and omentum were clear. Final pathology report showed positive washings, a 2.1 cm noninvasive high-grade serous endometrial cancer, and benign polyps. + fibroids. DNA mismatch repair intact showing this is not due to hoyos. ER receptor was 66% positive and VT receptor was 38% positive --> stage IA high-grade serous endometrial cancer with positive washings. Patient underwent 6 cycles of chemotherapy, completed 16. Dose reduction of Taxol done during cycle #4 due to neuropathy of fingers and toes. CT scan was negative on 16. Other notable history includes: atrial fibrillation on coumadin, morbid obesity, adult-onset diabetes, asthma and hyperlipidemia.     12/18/15: DaVinci TLH/BSO/bilateral PPALND/omental biopsy with Dr. Brunner for high grade serous endometrial cancer.      PATH REREAD:A. UTERUS, FALLOPIAN TUBES AND OVARIES, HYSTERECTOMY AND BILATERAL   SALPINGO-OOPHORECTOMY:   - Endometrial serous carcinoma   - Maximum tumor size 2.1cm   - Myometrial invasion absent   - Lower uterine segment, cervix and uterine serosa - uninvolved   - Lymphovascular invasion - absent   - Margins negative for tumor   - Ovary and fallopian tubes negative for carcinoma.   - Leiomyomas     B. LYMPH NODES, RIGHT PERIAORTIC, DISSECTION:   - Two lymph nodes negative for carcinoma (0/2)      C. LYMPH NODE, LEFT PERIAORTIC, DISSECTION:   - One lymph nodes negative for carcinoma (0/1)     D. LYMPH NODES, LEFT PELVIC, DISSECTION:   - Two lymph nodes negative for carcinoma (0/2)     E. LYMPH NODES, RIGHT PARA-AORTIC DISSECTION:   - Nine lymph nodes negative for carcinoma (0/9)     F. OMENTUM, OMENTECTOMY:   - Adipose tissue negative for carcinoma     COMMENT:   Tumor cells are focally positive for estrogen receptor (5%) and negative for progesterone receptor. DNA mismatch repair enzymes are intact. Immunostains performed in the outside institution are reviewed in-house.     Paclitaxel/Carboplatin x 6 cycles completed 5/12/16 at outside facility.       CT C/A/P at OSH (6/2016): negative for masses except stable left lung nodule      She was lost to follow up until 8/2017.          Today she comes to clinic and denies any concerns. She denies any vaginal bleeding, no changes in her bowel or bladder habits, no nausea/emesis, no lower extremity edema, and no difficulties eating or sleeping. She denies any abdominal discomfort/bloating, no fevers or chills, and no chest pain or shortness of breath. She is current with her mammogram and will be seeing her PCP next month for her annual exam. She is current with her colonoscopy. She is not sexually active. She has a BP cuff at home but has not been using it. She is due for an eye exam. She has been using her cpap and has been having some irritation/redness on her face from the mask. She has chronic back pain which comes and goes.         Review of Systems:    Systemic           no weight changes; no fever; no chills; no night sweats; no appetite changes  Skin           no rashes, or lesions  Eye           no irritation; no changes in vision  [place]-Laryngeal           no dysphagia; no hoarseness   Pulmonary    no cough; no shortness of breath  Cardiovascular    no chest pain; no palpitations; + HTN  Gastrointestinal    no diarrhea; no constipation; no abdominal  pain; no changes in bowel habits; no blood in stool  Genitourinary   no urinary frequency; no urinary urgency; no dysuria; no pain; no abnormal vaginal discharge; no abnormal vaginal bleeding  Breast    no breast discharge; no breast changes; no breast pain  Musculoskeletal    no myalgias; + arthralgias; no back pain  Psychiatric           no depressed mood; no anxiety    Hematologic              no tender lymph nodes; no noticeable swellings or lumps   Endocrine    no hot flashes; no heat/cold intolerance         Neurological   no tremor; no numbness and tingling; no headaches; no difficulty sleeping      Past Medical History:    Past Medical History:   Diagnosis Date     Anxiety      Asthma      Atrial fibrillation (H)      Depression      Endometrial cancer (H)      Hyperlipidemia      RONNY (obstructive sleep apnea)      Prediabetes          Past Surgical History:    Past Surgical History:   Procedure Laterality Date     DAVINCI HYSTERECTOMY TOTAL, BILATERAL SALPINGO-OOPHORECTOMY, NODE DISSECTION, COMBINED       oophorectomy Right     XL, dermoid, in 1981     THYROIDECTOMY      subtotal         Health Maintenance Due   Topic Date Due     DEXA  1945     ADVANCE CARE PLANNING  1945     DTAP/TDAP/TD IMMUNIZATION (1 - Tdap) 07/23/1968     LIPID  07/23/1988     ZOSTER IMMUNIZATION (1 of 2) 07/23/1993     MEDICARE ANNUAL WELLNESS VISIT  07/23/2008     PNEUMOCOCCAL IMMUNIZATION 65+ HIGH/HIGHEST RISK (1 of 2 - PCV13) 07/23/2008     PHQ-2  01/01/2019     INFLUENZA VACCINE (1) 09/01/2019       Current Medications:     Current Outpatient Medications   Medication Sig Dispense Refill     ACCU-CHEK SHIRLEY PLUS test strip   1     alendronate (FOSAMAX) 35 MG tablet TK 1 T PO Q WEEK  2     alendronate (FOSAMAX) 70 MG tablet Take 70 mg by mouth once a week       atorvastatin (LIPITOR) 20 MG tablet Take 20 mg by mouth daily        B Complex-Biotin-FA (B-50 COMPLEX PO) Take by mouth daily        BD AMOS U/F 32G X 4 MM  insulin pen needle USE UTD WITH VICTOZA PEN  2     blood glucose monitoring (ACCU-CHEK SHIRLEY PLUS) meter device kit USE UTD  0     blood glucose monitoring (SOFTCLIX) lancets TK UTD  11     buPROPion (WELLBUTRIN XL) 150 MG 24 hr tablet TK 1 T PO QAM FOR DEPRESSION  3     calcium-magnesium (CALMAG) 500-250 MG TABS per tablet Take 1 tablet by mouth 2 times daily (with meals)       cholecalciferol (VITAMIN D3) 5000 UNITS TABS tablet Take 5,000 Units by mouth daily       DEXILANT 60 MG CPDR CR capsule TK ONE C PO QAM FOR GASTRIC REFLUX  1     dexlansoprazole (DEXILANT) 30 MG CPDR CR capsule Take 60 mg by mouth daily        escitalopram (LEXAPRO) 20 MG tablet Take 20 mg by mouth daily        fluticasone-salmeterol (ADVAIR DISKUS) 250-50 MCG/DOSE diskus inhaler Inhale 1 puff into the lungs 2 times daily        furosemide (LASIX) 20 MG tablet TK 2 TS PO D IF WEIGHT UP BY 3 POUNDS FROM BASELINE AND HAVE SWELLING IN LOWER EXTREMITIES. OTHERWISE TK 1 T D.  1     ibuprofen (ADVIL/MOTRIN) 800 MG tablet Take 800 mg by mouth 3 times daily as needed        lisinopril (PRINIVIL/ZESTRIL) 2.5 MG tablet TK 1 T PO QAM FOR KIDNEY PROTECTION  4     metFORMIN (GLUCOPHAGE) 500 MG tablet Take 500 mg by mouth 2 times daily (with meals)        metFORMIN (GLUCOPHAGE-XR) 500 MG 24 hr tablet TK 3 TS PO D WF  5     metoprolol (LOPRESSOR) 25 MG tablet Take 25 mg by mouth 2 times daily        montelukast (SINGULAIR) 10 MG tablet Take 10 mg by mouth At Bedtime       ondansetron (ZOFRAN) 4 MG tablet TK 1 T PO Q 8 H PRF NAUSEA  5     oxybutynin (DITROPAN-XL) 10 MG 24 hr tablet Take 10 mg by mouth daily        PROCHLORPERAZINE MALEATE PO Take 10 mg by mouth every 6 hours as needed for nausea or vomiting       tiZANidine (ZANAFLEX) 2 MG tablet   2     TiZANidine HCl 2 MG CAPS Take 2 mg by mouth every 6 hours as needed        VENTOLIN  (90 Base) MCG/ACT inhaler INHALE 2 PUFFS Q 4 H PRF WHEEZING  3     VICTOZA PEN 18 MG/3ML soln INJ 0.6 MG ONCE D  "FOR 2 WEEKS THEN 1.2 MG ONCE D FOR 2 WEEKS THEN 1.8 MG ONCE D  1     warfarin (COUMADIN) 5 MG tablet Take 5 mg by mouth daily 2.5 mg (1/2 tablet) Tu-Su, whole tablet on Monday.       zolpidem (AMBIEN) 5 MG tablet Take 5 mg by mouth nightly as needed            Allergies:      No Known Allergies     Social History:     Social History     Tobacco Use     Smoking status: Former Smoker     Smokeless tobacco: Never Used     Tobacco comment: quite 40+ years ago    Substance Use Topics     Alcohol use: No     Comment: socially       History   Drug Use No         Family History:       Family History   Adopted: Yes         Physical Exam:     BP (!) 132/90   Pulse 97   Temp 97.8  F (36.6  C) (Oral)   Resp 14   Ht 1.626 m (5' 4\")   Wt 121.2 kg (267 lb 1.6 oz)   SpO2 95%   BMI 45.85 kg/m    Body mass index is 45.85 kg/m .    General Appearance: healthy and alert, no distress     HEENT: no thyromegaly, no palpable nodules or masses        Cardiovascular: regular rate and rhythm, no gallops, rubs or murmurs     Respiratory: lungs clear, no rales, rhonchi or wheezes, normal diaphragmatic excursion    Musculoskeletal: extremities non tender and without edema    Skin: no lesions or rashes     Neurological: normal gait, no gross defects     Psychiatric: appropriate mood and affect                               Hematological: normal cervical, supraclavicular and inguinal lymph nodes     Gastrointestinal:       abdomen soft, obese, non-tender, non-distended, no organomegaly or masses    Genitourinary: External genitalia and urethral meatus appears normal.  Vagina is smooth without nodularity or masses.  Cervix surgically absent.  Bimanual exam reveal no masses, nodularity or fullness--exam limited secondary to body habitus.  Recto-vaginal exam confirms these findings.      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 She completed chemotherapy 5/2016. "  She is here today for a surveillance visit.     20 minutes were spent with this patient, over 50% of that time was spent in symptom management, treatment planning and in counseling and coordination of care.      Plan:     1.)         Patient to RTC in 6 months for her next surveillance visit. Reviewed recommendations from [facility] not to perform surveillance pap smears in women diagnosed with endometrial cancer as this does not improve detection of local recurrence. Reviewed signs and symptoms for when she should contact the clinic or seek additional care. Patient to contact the clinic with any questions or concerns in the interim.     2.) Genetic risk factors were assessed and the patient does not meet the qualifications for a referral.      3.) Labs and/or tests ordered include:  None.      4.) Health maintenance issues addressed today include annual health maintenance and non-gynecologic issues with PCP. Encouraged to monitor her BP at home and follow up with her PCP next month at her annual exam.     NATHAN Sneed, WHNP-BC, ANP-BC  Women's Health Nurse Practitioner  Adult Nurse Pracitioner  Division of Gynecologic Oncology          CC  Patient Care Team:  Debra Wolfe NP as PCP - General (Nurse Practitioner)  SELF, REFERRED

## 2019-10-04 ENCOUNTER — ONCOLOGY VISIT (OUTPATIENT)
Dept: ONCOLOGY | Facility: CLINIC | Age: 74
End: 2019-10-04
Attending: NURSE PRACTITIONER
Payer: MEDICARE

## 2019-10-04 VITALS
SYSTOLIC BLOOD PRESSURE: 132 MMHG | HEART RATE: 97 BPM | OXYGEN SATURATION: 95 % | DIASTOLIC BLOOD PRESSURE: 90 MMHG | WEIGHT: 267.1 LBS | BODY MASS INDEX: 45.6 KG/M2 | RESPIRATION RATE: 14 BRPM | TEMPERATURE: 97.8 F | HEIGHT: 64 IN

## 2019-10-04 DIAGNOSIS — I15.9 SECONDARY HYPERTENSION: ICD-10-CM

## 2019-10-04 DIAGNOSIS — E66.01 MORBID OBESITY (H): ICD-10-CM

## 2019-10-04 DIAGNOSIS — C54.1 ENDOMETRIAL CANCER (H): Primary | ICD-10-CM

## 2019-10-04 PROCEDURE — G0463 HOSPITAL OUTPT CLINIC VISIT: HCPCS | Mod: ZF

## 2019-10-04 PROCEDURE — 99213 OFFICE O/P EST LOW 20 MIN: CPT | Mod: ZP | Performed by: NURSE PRACTITIONER

## 2019-10-04 ASSESSMENT — MIFFLIN-ST. JEOR: SCORE: 1686.56

## 2019-10-04 ASSESSMENT — PAIN SCALES - GENERAL: PAINLEVEL: NO PAIN (0)

## 2019-10-04 NOTE — LETTER
10/4/2019       RE: Yashira Son  [address]  [address]  [facility] [address]     Dear Colleague,    Thank you for referring your patient, Yashira Son, to the [facility]. Please see a copy of my visit note below.                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 She completed chemotherapy 2016.  She is here today for a surveillance visit.       HPI:  Ms. Son originally presented with post-menopausal bleeding in 2016 with atypical glandular cell pap in 2015. Biopsy revealed adenocarcinoma and the patient proceeded with subsequent robotic hysterectomy, BSO and cancer staging on 12/18/15. No malignancy seen at time of procedure and peritoneal surfaces and omentum were clear. Final pathology report showed positive washings, a 2.1 cm noninvasive high-grade serous endometrial cancer, and benign polyps. + fibroids. DNA mismatch repair intact showing this is not due to hoyos. ER receptor was 66% positive and [place] receptor was 38% positive --> stage IA high-grade serous endometrial cancer with positive washings. Patient underwent 6 cycles of chemotherapy, completed 16. Dose reduction of Taxol done during cycle #4 due to neuropathy of fingers and toes. CT scan was negative on 16. Other notable history includes: atrial fibrillation on coumadin, morbid obesity, adult-onset diabetes, asthma and hyperlipidemia.     12/18/15: [facility] TLH/BSO/bilateral PPALND/omental biopsy with Dr. Brunner for high grade serous endometrial cancer.      PATH REREAD:A. UTERUS, FALLOPIAN TUBES AND OVARIES, HYSTERECTOMY AND BILATERAL   SALPINGO-OOPHORECTOMY:   - Endometrial serous carcinoma   - Maximum tumor size 2.1cm   - Myometrial invasion absent   - Lower uterine segment, cervix and uterine serosa -  uninvolved   - Lymphovascular invasion - absent   - Margins negative for tumor   - Ovary and fallopian tubes negative for carcinoma.   - Leiomyomas     B. LYMPH NODES, RIGHT PERIAORTIC, DISSECTION:   - Two lymph nodes negative for carcinoma (0/2)     C. LYMPH NODE, LEFT PERIAORTIC, DISSECTION:   - One lymph nodes negative for carcinoma (0/1)     D. LYMPH NODES, LEFT PELVIC, DISSECTION:   - Two lymph nodes negative for carcinoma (0/2)     E. LYMPH NODES, RIGHT PARA-AORTIC DISSECTION:   - Nine lymph nodes negative for carcinoma (0/9)     F. OMENTUM, OMENTECTOMY:   - Adipose tissue negative for carcinoma     COMMENT:   Tumor cells are focally positive for estrogen receptor (5%) and negative for progesterone receptor. DNA mismatch repair enzymes are intact. Immunostains performed in the outside institution are reviewed in-house.     Paclitaxel/Carboplatin x 6 cycles completed 5/12/16 at outside facility.       CT C/A/P at OSH (6/2016): negative for masses except stable left lung nodule      She was lost to follow up until 8/2017.          Today she comes to clinic and denies any concerns. She denies any vaginal bleeding, no changes in her bowel or bladder habits, no nausea/emesis, no lower extremity edema, and no difficulties eating or sleeping. She denies any abdominal discomfort/bloating, no fevers or chills, and no chest pain or shortness of breath. She is current with her mammogram and will be seeing her PCP next month for her annual exam. She is current with her colonoscopy. She is not sexually active. She has a BP cuff at home but has not been using it. She is due for an eye exam. She has been using her cpap and has been having some irritation/redness on her face from the mask. She has chronic back pain which comes and goes.         Review of Systems:    Systemic           no weight changes; no fever; no chills; no night sweats; no appetite changes  Skin           no rashes, or lesions  Eye           no  irritation; no changes in vision  [place]-Laryngeal           no dysphagia; no hoarseness   Pulmonary    no cough; no shortness of breath  Cardiovascular    no chest pain; no palpitations; + HTN  Gastrointestinal    no diarrhea; no constipation; no abdominal pain; no changes in bowel habits; no blood in stool  Genitourinary   no urinary frequency; no urinary urgency; no dysuria; no pain; no abnormal vaginal discharge; no abnormal vaginal bleeding  Breast    no breast discharge; no breast changes; no breast pain  Musculoskeletal    no myalgias; + arthralgias; no back pain  Psychiatric           no depressed mood; no anxiety    Hematologic              no tender lymph nodes; no noticeable swellings or lumps   Endocrine    no hot flashes; no heat/cold intolerance         Neurological   no tremor; no numbness and tingling; no headaches; no difficulty sleeping      Past Medical History:    Past Medical History:   Diagnosis Date     Anxiety      Asthma      Atrial fibrillation (H)      Depression      Endometrial cancer (H)      Hyperlipidemia      RONNY (obstructive sleep apnea)      Prediabetes          Past Surgical History:    Past Surgical History:   Procedure Laterality Date     DAVINCI HYSTERECTOMY TOTAL, BILATERAL SALPINGO-OOPHORECTOMY, NODE DISSECTION, COMBINED       oophorectomy Right     XL, dermoid, in 1981     THYROIDECTOMY      subtotal         Health Maintenance Due   Topic Date Due     DEXA  1945     ADVANCE CARE PLANNING  1945     DTAP/TDAP/TD IMMUNIZATION (1 - Tdap) 07/23/1968     LIPID  07/23/1988     ZOSTER IMMUNIZATION (1 of 2) 07/23/1993     MEDICARE ANNUAL WELLNESS VISIT  07/23/2008     PNEUMOCOCCAL IMMUNIZATION 65+ HIGH/HIGHEST RISK (1 of 2 - PCV13) 07/23/2008     PHQ-2  01/01/2019     INFLUENZA VACCINE (1) 09/01/2019       Current Medications:     Current Outpatient Medications   Medication Sig Dispense Refill     ACCU-CHEK SHIRLEY PLUS test strip   1     alendronate (FOSAMAX) 35 MG tablet TK  1 T PO Q WEEK  2     alendronate (FOSAMAX) 70 MG tablet Take 70 mg by mouth once a week       atorvastatin (LIPITOR) 20 MG tablet Take 20 mg by mouth daily        B Complex-Biotin-FA (B-50 COMPLEX PO) Take by mouth daily        BD AMOS U/F 32G X 4 MM insulin pen needle USE UTD WITH VICTOZA PEN  2     blood glucose monitoring (ACCU-CHEK SHIRLEY PLUS) meter device kit USE UTD  0     blood glucose monitoring (SOFTCLIX) lancets TK UTD  11     buPROPion (WELLBUTRIN XL) 150 MG 24 hr tablet TK 1 T PO QAM FOR DEPRESSION  3     calcium-magnesium (CALMAG) 500-250 MG TABS per tablet Take 1 tablet by mouth 2 times daily (with meals)       cholecalciferol (VITAMIN D3) 5000 UNITS TABS tablet Take 5,000 Units by mouth daily       DEXILANT 60 MG CPDR CR capsule TK ONE C PO QAM FOR GASTRIC REFLUX  1     dexlansoprazole (DEXILANT) 30 MG CPDR CR capsule Take 60 mg by mouth daily        escitalopram (LEXAPRO) 20 MG tablet Take 20 mg by mouth daily        fluticasone-salmeterol (ADVAIR DISKUS) 250-50 MCG/DOSE diskus inhaler Inhale 1 puff into the lungs 2 times daily        furosemide (LASIX) 20 MG tablet TK 2 TS PO D IF WEIGHT UP BY 3 POUNDS FROM BASELINE AND HAVE SWELLING IN LOWER EXTREMITIES. OTHERWISE TK 1 T D.  1     ibuprofen (ADVIL/MOTRIN) 800 MG tablet Take 800 mg by mouth 3 times daily as needed        lisinopril (PRINIVIL/ZESTRIL) 2.5 MG tablet TK 1 T PO QAM FOR KIDNEY PROTECTION  4     metFORMIN (GLUCOPHAGE) 500 MG tablet Take 500 mg by mouth 2 times daily (with meals)        metFORMIN (GLUCOPHAGE-XR) 500 MG 24 hr tablet TK 3 TS PO D WF  5     metoprolol (LOPRESSOR) 25 MG tablet Take 25 mg by mouth 2 times daily        montelukast (SINGULAIR) 10 MG tablet Take 10 mg by mouth At Bedtime       ondansetron (ZOFRAN) 4 MG tablet TK 1 T PO Q 8 H PRF NAUSEA  5     oxybutynin (DITROPAN-XL) 10 MG 24 hr tablet Take 10 mg by mouth daily        PROCHLORPERAZINE MALEATE PO Take 10 mg by mouth every 6 hours as needed for nausea or vomiting    "    tiZANidine (ZANAFLEX) 2 MG tablet   2     TiZANidine HCl 2 MG CAPS Take 2 mg by mouth every 6 hours as needed        VENTOLIN  (90 Base) MCG/ACT inhaler INHALE 2 PUFFS Q 4 H PRF WHEEZING  3     VICTOZA PEN 18 MG/3ML soln INJ 0.6 MG ONCE D FOR 2 WEEKS THEN 1.2 MG ONCE D FOR 2 WEEKS THEN 1.8 MG ONCE D  1     warfarin (COUMADIN) 5 MG tablet Take 5 mg by mouth daily 2.5 mg (1/2 tablet) Tu-Su, whole tablet on Monday.       zolpidem (AMBIEN) 5 MG tablet Take 5 mg by mouth nightly as needed            Allergies:      No Known Allergies     Social History:     Social History     Tobacco Use     Smoking status: Former Smoker     Smokeless tobacco: Never Used     Tobacco comment: quite 40+ years ago    Substance Use Topics     Alcohol use: No     Comment: socially       History   Drug Use No         Family History:       Family History   Adopted: Yes         Physical Exam:     BP (!) 132/90   Pulse 97   Temp 97.8  F (36.6  C) (Oral)   Resp 14   Ht 1.626 m (5' 4\")   Wt 121.2 kg (267 lb 1.6 oz)   SpO2 95%   BMI 45.85 kg/m     Body mass index is 45.85 kg/m .    General Appearance: healthy and alert, no distress     HEENT: no thyromegaly, no palpable nodules or masses        Cardiovascular: regular rate and rhythm, no gallops, rubs or murmurs     Respiratory: lungs clear, no rales, rhonchi or wheezes, normal diaphragmatic excursion    Musculoskeletal: extremities non tender and without edema    Skin: no lesions or rashes     Neurological: normal gait, no gross defects     Psychiatric: appropriate mood and affect                               Hematological: normal cervical, supraclavicular and inguinal lymph nodes     Gastrointestinal:       abdomen soft, obese, non-tender, non-distended, no organomegaly or masses    Genitourinary: External genitalia and urethral meatus appears normal.  Vagina is smooth without nodularity or masses.  Cervix surgically absent.  Bimanual exam reveal no masses, nodularity or " fullness--exam limited secondary to body habitus.  Recto-vaginal exam confirms these findings.      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 She completed chemotherapy 5/2016.  She is here today for a surveillance visit.     20 minutes were spent with this patient, over 50% of that time was spent in symptom management, treatment planning and in counseling and coordination of care.      Plan:     1.)         Patient to RTC in 6 months for her next surveillance visit. Reviewed recommendations from [facility] not to perform surveillance pap smears in women diagnosed with endometrial cancer as this does not improve detection of local recurrence. Reviewed signs and symptoms for when she should contact the clinic or seek additional care. Patient to contact the clinic with any questions or concerns in the interim.     2.) Genetic risk factors were assessed and the patient does not meet the qualifications for a referral.      3.) Labs and/or tests ordered include:  None.      4.) Health maintenance issues addressed today include annual health maintenance and non-gynecologic issues with PCP. Encouraged to monitor her BP at home and follow up with her PCP next month at her annual exam.     NATHAN Sneed, WHNP-BC, ANP-BC  Women's Health Nurse Practitioner  Adult Nurse Pracitioner  Division of Gynecologic Oncology      CC  Patient Care Team:  Debra Wolfe NP as PCP - General (Nurse Practitioner)

## 2019-10-04 NOTE — NURSING NOTE
"Oncology Rooming Note    October 4, 2019 1:24 PM   Yashira Son is a 74 year old female who presents for:    Chief Complaint   Patient presents with     Oncology Clinic Visit     Return - Endometrial Ca     Initial Vitals: BP (!) 132/90   Pulse 97   Temp 97.8  F (36.6  C) (Oral)   Resp 14   Ht 1.626 m (5' 4\")   Wt 121.2 kg (267 lb 1.6 oz)   SpO2 95%   BMI 45.85 kg/m   Estimated body mass index is 45.85 kg/m  as calculated from the following:    Height as of this encounter: 1.626 m (5' 4\").    Weight as of this encounter: 121.2 kg (267 lb 1.6 oz). Body surface area is 2.34 meters squared.  No Pain (0) Comment: Data Unavailable   No LMP recorded. Patient has had a hysterectomy.  Allergies reviewed: Yes  Medications reviewed: Yes    Medications: Medication refills not needed today.  Pharmacy name entered into [employer]: [id number] - [address] AT [address] [place] [address]    Clinical concerns: 6 month follow up       Dejuan José, EMT              "

## 2020-05-28 NOTE — PROGRESS NOTES
"Yashira Son is a 74 year old female who is being evaluated via a billable telephone visit.      The patient has been notified of following:     \"This telephone visit will be conducted via a call between you and your physician/provider. We have found that certain health care needs can be provided without the need for a physical exam.  This service lets us provide the care you need with a short phone conversation.  If a prescription is necessary we can send it directly to your pharmacy.  If lab work is needed we can place an order for that and you can then stop by our lab to have the test done at a later time.    Telephone visits are billed at different rates depending on your insurance coverage. During this emergency period, for some insurers they may be billed the same as an in-person visit.  Please reach out to your insurance provider with any questions.    If during the course of the call the physician/provider feels a telephone visit is not appropriate, you will not be charged for this service.\"    Patient has given verbal consent for Telephone visit?  Yes    What phone number would you like to be contacted at? [phone]    How would you like to obtain your AVS? Mail a copy     I have reviewed and updated the patient's allergies and medication list.    Concerns: Patient has no new concerns.      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 She completed chemotherapy 2016.  She is here today for a surveillance visit. Based upon CDC guidance and to observe social distancing in the setting of the COVID-19 pandemic, the patient was seen virtually via phone visit.       HPI:  Ms. Son originally presented with post-menopausal bleeding in 2016 " with atypical glandular cell pap in July 2015. Biopsy revealed adenocarcinoma and the patient proceeded with subsequent robotic hysterectomy, BSO and cancer staging on 12/18/15. No malignancy seen at time of procedure and peritoneal surfaces and omentum were clear. Final pathology report showed positive washings, a 2.1 cm noninvasive high-grade serous endometrial cancer, and benign polyps. + fibroids. DNA mismatch repair intact showing this is not due to hoyos. ER receptor was 66% positive and [place] receptor was 38% positive --> stage IA high-grade serous endometrial cancer with positive washings. Patient underwent 6 cycles of chemotherapy, completed 5/12/16. Dose reduction of Taxol done during cycle #4 due to neuropathy of fingers and toes. CT scan was negative on 6/12/16. Other notable history includes: atrial fibrillation on coumadin, morbid obesity, adult-onset diabetes, asthma and hyperlipidemia.     12/18/15: [facility] TLH/BSO/bilateral PPALND/omental biopsy with Dr. Brunner for high grade serous endometrial cancer.      PATH REREAD:A. UTERUS, FALLOPIAN TUBES AND OVARIES, HYSTERECTOMY AND BILATERAL   SALPINGO-OOPHORECTOMY:   - Endometrial serous carcinoma   - Maximum tumor size 2.1cm   - Myometrial invasion absent   - Lower uterine segment, cervix and uterine serosa - uninvolved   - Lymphovascular invasion - absent   - Margins negative for tumor   - Ovary and fallopian tubes negative for carcinoma.   - Leiomyomas     B. LYMPH NODES, RIGHT PERIAORTIC, DISSECTION:   - Two lymph nodes negative for carcinoma (0/2)     C. LYMPH NODE, LEFT PERIAORTIC, DISSECTION:   - One lymph nodes negative for carcinoma (0/1)     D. LYMPH NODES, LEFT PELVIC, DISSECTION:   - Two lymph nodes negative for carcinoma (0/2)     E. LYMPH NODES, RIGHT PARA-AORTIC DISSECTION:   - Nine lymph nodes negative for carcinoma (0/9)     F. OMENTUM, OMENTECTOMY:   - Adipose tissue negative for carcinoma     COMMENT:   Tumor cells are focally positive for  estrogen receptor (5%) and negative for progesterone receptor. DNA mismatch repair enzymes are intact. Immunostains performed in the outside institution are reviewed in-house.      Paclitaxel/Carboplatin x 6 cycles completed 5/12/16 at outside facility.       CT C/A/P at OSH (6/2016): negative for masses except stable left lung nodule      She was lost to follow up until 8/2017.      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  She was scheduled to see a genetic counselor but then covid happened and she has not been able to be seen as of yet. She denies any vaginal bleeding, no changes in her bowel or bladder habits, no nausea/emesis, no lower extremity edema, and no difficulties eating or sleeping. She denies any abdominal discomfort/bloating, no fevers or chills, and no chest pain or shortness of breath. Her PCP keeps her current on her screenings and medications.       Review of Systems:    Systemic           no weight changes; no fever; no chills; no night sweats; no appetite changes  Skin           no rashes, or lesions  Eye           no irritation; no changes in vision  [place]-Laryngeal           no dysphagia; no hoarseness   Pulmonary    no cough; no shortness of breath  Cardiovascular    no chest pain; no palpitations; + HTN  Gastrointestinal    no diarrhea; no constipation; no abdominal pain; no changes in bowel habits; no blood in stool  Genitourinary   no urinary frequency; no urinary urgency; no dysuria; no pain; no abnormal vaginal discharge; no abnormal vaginal bleeding  Breast    no breast discharge; no breast changes; no breast pain  Musculoskeletal    no myalgias; no arthralgias; no back pain  Psychiatric           no depressed mood; + anxiety    Hematologic               no tender  lymph nodes; no noticeable swellings or lumps   Endocrine    no hot flashes; no heat/cold intolerance         Neurological   no tremor; no numbness and tingling; no headaches; no difficulty sleeping      Past Medical History:    Past Medical History:   Diagnosis Date     Anxiety      Asthma      Atrial fibrillation (H)      Depression      Endometrial cancer (H)      Hyperlipidemia      RONNY (obstructive sleep apnea)      Prediabetes          Past Surgical History:    Past Surgical History:   Procedure Laterality Date     DAVINCI HYSTERECTOMY TOTAL, BILATERAL SALPINGO-OOPHORECTOMY, NODE DISSECTION, COMBINED       oophorectomy Right     XL, dermoid, in 1981     THYROIDECTOMY      subtotal         Health Maintenance Due   Topic Date Due     DEXA  1945     ADVANCE CARE PLANNING  1945     LIPID  07/23/1988     ZOSTER IMMUNIZATION (1 of 2) 07/23/1993     MEDICARE ANNUAL WELLNESS VISIT  07/23/2008     PNEUMOCOCCAL IMMUNIZATION 65+ LOW/MEDIUM RISK (2 of 2 - PCV13) 04/27/2012     PHQ-2  01/01/2020       Current Medications:     Current Outpatient Medications   Medication Sig Dispense Refill     ACCU-CHEK SHIRLEY PLUS test strip   1     alendronate (FOSAMAX) 35 MG tablet TK 1 T PO Q WEEK  2     alendronate (FOSAMAX) 70 MG tablet Take 70 mg by mouth once a week       atorvastatin (LIPITOR) 20 MG tablet Take 20 mg by mouth daily        B Complex-Biotin-FA (B-50 COMPLEX PO) Take by mouth daily        BD AMOS U/F 32G X 4 MM insulin pen needle USE UTD WITH VICTOZA PEN  2     blood glucose monitoring (ACCU-CHEK SHIRLEY PLUS) meter device kit USE UTD  0     blood glucose monitoring (SOFTCLIX) lancets TK UTD  11     buPROPion (WELLBUTRIN XL) 150 MG 24 hr tablet TK 1 T PO QAM FOR DEPRESSION  3     calcium-magnesium (CALMAG) 500-250 MG TABS per tablet Take 1 tablet by mouth 2 times daily (with meals)       cholecalciferol (VITAMIN D3) 5000 UNITS TABS tablet Take 5,000 Units by mouth daily       DEXILANT 60 MG CPDR CR capsule TK  ONE C PO QAM FOR GASTRIC REFLUX  1     dexlansoprazole (DEXILANT) 30 MG CPDR CR capsule Take 60 mg by mouth daily        escitalopram (LEXAPRO) 20 MG tablet Take 20 mg by mouth daily        exemestane (AROMASIN) 25 MG tablet 25 mg daily        fluticasone-salmeterol (ADVAIR DISKUS) 250-50 MCG/DOSE diskus inhaler Inhale 1 puff into the lungs 2 times daily        furosemide (LASIX) 20 MG tablet TK 2 TS PO D IF WEIGHT UP BY 3 POUNDS FROM BASELINE AND HAVE SWELLING IN LOWER EXTREMITIES. OTHERWISE TK 1 T D.  1     ibuprofen (ADVIL/MOTRIN) 800 MG tablet Take 800 mg by mouth 3 times daily as needed        lisinopril (PRINIVIL/ZESTRIL) 2.5 MG tablet TK 1 T PO QAM FOR KIDNEY PROTECTION  4     metFORMIN (GLUCOPHAGE) 500 MG tablet Take 500 mg by mouth 2 times daily (with meals)        metFORMIN (GLUCOPHAGE-XR) 500 MG 24 hr tablet TK 3 TS PO D WF  5     metoprolol (LOPRESSOR) 25 MG tablet Take 25 mg by mouth 2 times daily        montelukast (SINGULAIR) 10 MG tablet Take 10 mg by mouth At Bedtime       ondansetron (ZOFRAN) 4 MG tablet TK 1 T PO Q 8 H PRF NAUSEA  5     oxybutynin (DITROPAN-XL) 10 MG 24 hr tablet Take 10 mg by mouth daily        PROCHLORPERAZINE MALEATE PO Take 10 mg by mouth every 6 hours as needed for nausea or vomiting       tiZANidine (ZANAFLEX) 2 MG tablet   2     TiZANidine HCl 2 MG CAPS Take 2 mg by mouth every 6 hours as needed        VENTOLIN  (90 Base) MCG/ACT inhaler INHALE 2 PUFFS Q 4 H PRF WHEEZING  3     VICTOZA PEN 18 MG/3ML soln INJ 0.6 MG ONCE D FOR 2 WEEKS THEN 1.2 MG ONCE D FOR 2 WEEKS THEN 1.8 MG ONCE D  1     warfarin (COUMADIN) 5 MG tablet Take 5 mg by mouth daily 2.5 mg (1/2 tablet) Tu-Su, whole tablet on Monday.       zolpidem (AMBIEN) 5 MG tablet Take 5 mg by mouth nightly as needed            Allergies:      No Known Allergies     Social History:     Social History     Tobacco Use     Smoking status: Former Smoker     Smokeless tobacco: Never Used     Tobacco comment: quite 40+  years ago    Substance Use Topics     Alcohol use: No     Comment: socially       History   Drug Use No         Family History:       Family History   Adopted: Yes         Physical Exam:     There were no vitals taken for this visit.  There is no height or weight on file to calculate BMI.     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 She completed chemotherapy 5/2016.  She is here today for a surveillance visit. Based upon CDC guidance and to observe social distancing in the setting of the COVID-19 pandemic, the patient was seen virtually via phone visit.      17 minutes were spent with this patient by phone. Over 50% of that time was spent in symptom management, treatment planning and in counseling and coordination of care. See rooming note for consent.       Plan:     1.)        Patient to RTC in 6 months for her next surveillance visit and will continue to have visits every 6 months until 5/2021. Reviewed recommendations from [facility] not to perform surveillance pap smears in women diagnosed with endometrial cancer as this does not improve detection of local recurrence. Reviewed signs and symptoms for when she should contact the clinic or seek additional care. Patient to contact the clinic with any questions or concerns in the interim.     2.) Genetic risk factors were assessed and the patient does meet the qualifications for a referral now given her recent diagnosis of DCIS; she will do this through her medical oncology.    3.) Labs and/or tests ordered include:  None.      4.) Health maintenance issues addressed today include annual health maintenance and non-gynecologic issues with PCP.    NATHAN Sneed, WHNP-BC, ANP-BC  Women's Health Nurse Practitioner  Adult Nurse Pracitioner  Division of Gynecologic Oncology          CC  Patient Care Team:  Yaneth  CRUZITO Richardson as PCP - General (Nurse Practitioner)  SELF, REFERRED

## 2020-05-29 ENCOUNTER — VIRTUAL VISIT (OUTPATIENT)
Dept: ONCOLOGY | Facility: CLINIC | Age: 75
End: 2020-05-29
Attending: NURSE PRACTITIONER
Payer: MEDICARE

## 2020-05-29 DIAGNOSIS — C54.1 ENDOMETRIAL CANCER (H): Primary | ICD-10-CM

## 2020-05-29 PROCEDURE — 40000114 ZZH STATISTIC NO CHARGE CLINIC VISIT

## 2020-05-29 PROCEDURE — 99442: CPT | Performed by: NURSE PRACTITIONER

## 2020-05-29 RX ORDER — EXEMESTANE 25 MG/1
25 TABLET ORAL DAILY
COMMUNITY
Start: 2020-05-04

## 2020-05-29 NOTE — LETTER
"    2020         RE: Yashira Son  [address]  [address]  [facility] [address]        Dear Colleague,    Thank you for referring your patient, Yashira Son, to the [facility]. Please see a copy of my visit note below.    Yashira Son is a 74 year old female who is being evaluated via a billable telephone visit.      The patient has been notified of following:     \"This telephone visit will be conducted via a call between you and your physician/provider. We have found that certain health care needs can be provided without the need for a physical exam.  This service lets us provide the care you need with a short phone conversation.  If a prescription is necessary we can send it directly to your pharmacy.  If lab work is needed we can place an order for that and you can then stop by our lab to have the test done at a later time.    Telephone visits are billed at different rates depending on your insurance coverage. During this emergency period, for some insurers they may be billed the same as an in-person visit.  Please reach out to your insurance provider with any questions.    If during the course of the call the physician/provider feels a telephone visit is not appropriate, you will not be charged for this service.\"    Patient has given verbal consent for Telephone visit?  Yes    What phone number would you like to be contacted at? [phone]    How would you like to obtain your AVS? Mail a copy     I have reviewed and updated the patient's allergies and medication list.    Concerns: Patient has no new concerns.      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 She completed chemotherapy 2016.  She is here today for a " surveillance visit. Based upon CDC guidance and to observe social distancing in the setting of the COVID-19 pandemic, the patient was seen virtually via phone visit.       HPI:  Ms. Son originally presented with post-menopausal bleeding in October 2016 with atypical glandular cell pap in July 2015. Biopsy revealed adenocarcinoma and the patient proceeded with subsequent robotic hysterectomy, BSO and cancer staging on 12/18/15. No malignancy seen at time of procedure and peritoneal surfaces and omentum were clear. Final pathology report showed positive washings, a 2.1 cm noninvasive high-grade serous endometrial cancer, and benign polyps. + fibroids. DNA mismatch repair intact showing this is not due to hoyos. ER receptor was 66% positive and HI receptor was 38% positive --> stage IA high-grade serous endometrial cancer with positive washings. Patient underwent 6 cycles of chemotherapy, completed 5/12/16. Dose reduction of Taxol done during cycle #4 due to neuropathy of fingers and toes. CT scan was negative on 6/12/16. Other notable history includes: atrial fibrillation on coumadin, morbid obesity, adult-onset diabetes, asthma and hyperlipidemia.     12/18/15: DaVinci TLH/BSO/bilateral PPALND/omental biopsy with Dr. Brunner for high grade serous endometrial cancer.      PATH REREAD:A. UTERUS, FALLOPIAN TUBES AND OVARIES, HYSTERECTOMY AND BILATERAL   SALPINGO-OOPHORECTOMY:   - Endometrial serous carcinoma   - Maximum tumor size 2.1cm   - Myometrial invasion absent   - Lower uterine segment, cervix and uterine serosa - uninvolved   - Lymphovascular invasion - absent   - Margins negative for tumor   - Ovary and fallopian tubes negative for carcinoma.   - Leiomyomas     B. LYMPH NODES, RIGHT PERIAORTIC, DISSECTION:   - Two lymph nodes negative for carcinoma (0/2)     C. LYMPH NODE, LEFT PERIAORTIC, DISSECTION:   - One lymph nodes negative for carcinoma (0/1)     D. LYMPH NODES, LEFT PELVIC, DISSECTION:   - Two lymph  nodes negative for carcinoma (0/2)     E. LYMPH NODES, RIGHT PARA-AORTIC DISSECTION:   - Nine lymph nodes negative for carcinoma (0/9)     F. OMENTUM, OMENTECTOMY:   - Adipose tissue negative for carcinoma     COMMENT:   Tumor cells are focally positive for estrogen receptor (5%) and negative for progesterone receptor. DNA mismatch repair enzymes are intact. Immunostains performed in the outside institution are reviewed in-house.      Paclitaxel/Carboplatin x 6 cycles completed 5/12/16 at outside facility.       CT C/A/P at OSH (6/2016): negative for masses except stable left lung nodule      She was lost to follow up until 8/2017.      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  She was scheduled to see a genetic counselor but then covid happened and she has not been able to be seen as of yet. She denies any vaginal bleeding, no changes in her bowel or bladder habits, no nausea/emesis, no lower extremity edema, and no difficulties eating or sleeping. She denies any abdominal discomfort/bloating, no fevers or chills, and no chest pain or shortness of breath. Her PCP keeps her current on her screenings and medications.       Review of Systems:    Systemic           no weight changes; no fever; no chills; no night sweats; no appetite changes  Skin           no rashes, or lesions  Eye           no irritation; no changes in vision  [place]-Laryngeal           no dysphagia; no hoarseness   Pulmonary    no cough; no shortness of breath  Cardiovascular    no chest pain; no palpitations; + HTN  Gastrointestinal    no diarrhea; no constipation; no abdominal pain; no changes in bowel habits; no blood in stool  Genitourinary   no urinary frequency; no urinary urgency; no dysuria; no pain; no abnormal vaginal  discharge; no abnormal vaginal bleeding  Breast    no breast discharge; no breast changes; no breast pain  Musculoskeletal    no myalgias; no arthralgias; no back pain  Psychiatric           no depressed mood; + anxiety    Hematologic               no tender lymph nodes; no noticeable swellings or lumps   Endocrine    no hot flashes; no heat/cold intolerance         Neurological   no tremor; no numbness and tingling; no headaches; no difficulty sleeping      Past Medical History:    Past Medical History:   Diagnosis Date     Anxiety      Asthma      Atrial fibrillation (H)      Depression      Endometrial cancer (H)      Hyperlipidemia      RONNY (obstructive sleep apnea)      Prediabetes          Past Surgical History:    Past Surgical History:   Procedure Laterality Date     DAVINCI HYSTERECTOMY TOTAL, BILATERAL SALPINGO-OOPHORECTOMY, NODE DISSECTION, COMBINED       oophorectomy Right     XL, dermoid, in 1981     THYROIDECTOMY      subtotal         Health Maintenance Due   Topic Date Due     DEXA  1945     ADVANCE CARE PLANNING  1945     LIPID  07/23/1988     ZOSTER IMMUNIZATION (1 of 2) 07/23/1993     MEDICARE ANNUAL WELLNESS VISIT  07/23/2008     PNEUMOCOCCAL IMMUNIZATION 65+ LOW/MEDIUM RISK (2 of 2 - PCV13) 04/27/2012     PHQ-2  01/01/2020       Current Medications:     Current Outpatient Medications   Medication Sig Dispense Refill     ACCU-CHEK SHIRLEY PLUS test strip   1     alendronate (FOSAMAX) 35 MG tablet TK 1 T PO Q WEEK  2     alendronate (FOSAMAX) 70 MG tablet Take 70 mg by mouth once a week       atorvastatin (LIPITOR) 20 MG tablet Take 20 mg by mouth daily        B Complex-Biotin-FA (B-50 COMPLEX PO) Take by mouth daily        BD AMOS U/F 32G X 4 MM insulin pen needle USE UTD WITH VICTOZA PEN  2     blood glucose monitoring (ACCU-CHEK SHIRLEY PLUS) meter device kit USE UTD  0     blood glucose monitoring (SOFTCLIX) lancets TK UTD  11     buPROPion (WELLBUTRIN XL) 150 MG 24 hr tablet TK 1 T PO  QAM FOR DEPRESSION  3     calcium-magnesium (CALMAG) 500-250 MG TABS per tablet Take 1 tablet by mouth 2 times daily (with meals)       cholecalciferol (VITAMIN D3) 5000 UNITS TABS tablet Take 5,000 Units by mouth daily       DEXILANT 60 MG CPDR CR capsule TK ONE C PO QAM FOR GASTRIC REFLUX  1     dexlansoprazole (DEXILANT) 30 MG CPDR CR capsule Take 60 mg by mouth daily        escitalopram (LEXAPRO) 20 MG tablet Take 20 mg by mouth daily        exemestane (AROMASIN) 25 MG tablet 25 mg daily        fluticasone-salmeterol (ADVAIR DISKUS) 250-50 MCG/DOSE diskus inhaler Inhale 1 puff into the lungs 2 times daily        furosemide (LASIX) 20 MG tablet TK 2 TS PO D IF WEIGHT UP BY 3 POUNDS FROM BASELINE AND HAVE SWELLING IN LOWER EXTREMITIES. OTHERWISE TK 1 T D.  1     ibuprofen (ADVIL/MOTRIN) 800 MG tablet Take 800 mg by mouth 3 times daily as needed        lisinopril (PRINIVIL/ZESTRIL) 2.5 MG tablet TK 1 T PO QAM FOR KIDNEY PROTECTION  4     metFORMIN (GLUCOPHAGE) 500 MG tablet Take 500 mg by mouth 2 times daily (with meals)        metFORMIN (GLUCOPHAGE-XR) 500 MG 24 hr tablet TK 3 TS PO D WF  5     metoprolol (LOPRESSOR) 25 MG tablet Take 25 mg by mouth 2 times daily        montelukast (SINGULAIR) 10 MG tablet Take 10 mg by mouth At Bedtime       ondansetron (ZOFRAN) 4 MG tablet TK 1 T PO Q 8 H PRF NAUSEA  5     oxybutynin (DITROPAN-XL) 10 MG 24 hr tablet Take 10 mg by mouth daily        PROCHLORPERAZINE MALEATE PO Take 10 mg by mouth every 6 hours as needed for nausea or vomiting       tiZANidine (ZANAFLEX) 2 MG tablet   2     TiZANidine HCl 2 MG CAPS Take 2 mg by mouth every 6 hours as needed        VENTOLIN  (90 Base) MCG/ACT inhaler INHALE 2 PUFFS Q 4 H PRF WHEEZING  3     VICTOZA PEN 18 MG/3ML soln INJ 0.6 MG ONCE D FOR 2 WEEKS THEN 1.2 MG ONCE D FOR 2 WEEKS THEN 1.8 MG ONCE D  1     warfarin (COUMADIN) 5 MG tablet Take 5 mg by mouth daily 2.5 mg (1/2 tablet) Tu-Chen, whole tablet on Monday.       zolpidem  (AMBIEN) 5 MG tablet Take 5 mg by mouth nightly as needed            Allergies:      No Known Allergies     Social History:     Social History     Tobacco Use     Smoking status: Former Smoker     Smokeless tobacco: Never Used     Tobacco comment: quite 40+ years ago    Substance Use Topics     Alcohol use: No     Comment: socially       History   Drug Use No         Family History:       Family History   Adopted: Yes         Physical Exam:     There were no vitals taken for this visit.  There is no height or weight on file to calculate BMI.     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 She completed chemotherapy 5/2016.  She is here today for a surveillance visit. Based upon CDC guidance and to observe social distancing in the setting of the COVID-19 pandemic, the patient was seen virtually via phone visit.      17 minutes were spent with this patient by phone. Over 50% of that time was spent in symptom management, treatment planning and in counseling and coordination of care. See rooming note for consent.       Plan:     1.)        Patient to RTC in 6 months for her next surveillance visit and will continue to have visits every 6 months until 5/2021. Reviewed recommendations from [facility] not to perform surveillance pap smears in women diagnosed with endometrial cancer as this does not improve detection of local recurrence. Reviewed signs and symptoms for when she should contact the clinic or seek additional care. Patient to contact the clinic with any questions or concerns in the interim.     2.) Genetic risk factors were assessed and the patient does meet the qualifications for a referral now given her recent diagnosis of DCIS; she will do this through her medical oncology.    3.) Labs and/or tests ordered include:  None.      4.) Health maintenance issues  addressed today include annual health maintenance and non-gynecologic issues with PCP.    NATHAN Sneed, WHNP-BC, ANP-BC  Women's Health Nurse Practitioner  Adult Nurse Pracitioner  Division of Gynecologic Oncology          CC  Patient Care Team:  Debra Wolfe NP as PCP - General (Nurse Practitioner)

## 2020-09-23 ENCOUNTER — OFFICE VISIT (OUTPATIENT)
Dept: ORTHOPEDICS | Facility: CLINIC | Age: 75
End: 2020-09-23
Payer: MEDICARE

## 2020-09-23 VITALS — BODY MASS INDEX: 45.24 KG/M2 | HEIGHT: 64 IN | WEIGHT: 265 LBS

## 2020-09-23 DIAGNOSIS — M17.12 PRIMARY OSTEOARTHRITIS OF LEFT KNEE: Primary | ICD-10-CM

## 2020-09-23 RX ADMIN — LIDOCAINE HYDROCHLORIDE 4 ML: 10 INJECTION, SOLUTION EPIDURAL; INFILTRATION; INTRACAUDAL; PERINEURAL at 12:19

## 2020-09-23 RX ADMIN — TRIAMCINOLONE ACETONIDE 40 MG: 40 INJECTION, SUSPENSION INTRA-ARTICULAR; INTRAMUSCULAR at 12:19

## 2020-09-23 ASSESSMENT — MIFFLIN-ST. JEOR: SCORE: 1672.03

## 2020-09-23 NOTE — LETTER
9/23/2020         RE: Yashira Son  [address]  [address]  [facility] [address]        Dear Colleague,    Thank you for referring your patient, Yashira Son, to the [facility]. Please see a copy of my visit note below.          SPORTS & ORTHOPEDIC WALK-IN VISIT 9/23/2020    Primary Care Physician: Dr. Wolfe    Here today for left knee pain. ~8/3/20 - started to experience increased L knee pain suddenly without any injury or overuse. She mentions that this pain is much worse than pain she has experienced in the past. Seen and evaluated recently at [facility] and dx with OA. reports not begin given much advice or follow up information. Would like to discuss options further.     Most of her pain is noticed medially. Also notices pain travel distally along the anterior aspect of her L tibia. Pain is more tolerable as she is standing/walking. Pain is worst with sitting or laying.        Reason for visit:     What part of your body is injured / painful?  left knee    What caused the injury /pain? Unsure    How long ago did your injury occur or pain begin? several months ago    What are your most bothersome symptoms? Pain    How would you characterize your symptom?  aching and dull    What makes your symptoms better? Rest and Other: some movement    What makes your symptoms worse? Other: transitioning from sitting to standing    Have you been previously seen for this problem? PCP and [facility]    Medical History:    Any recent changes to your medical history? No    Any new medication prescribed since last visit? No    Have you had surgery on this body part before? No    Review of Systems:    Do you have fever, chills, weight loss? No    Do you have any vision problems? No    Do you have any chest pain or edema? No    Do you have any shortness of breath or wheezing?  Yes, Normal after activity    Do you have stomach problems? No    Do you have any numbness or focal weakness? No    Do you  "have diabetes? Yes, pre-diabetic    Do you have problems with bleeding or clotting? Yes, warfarin    Do you have an rashes or other skin lesions? No         Past Medical History, Current Medications, and Allergies are reviewed in the electronic medical record as appropriate.       EXAM:Ht 1.626 m (5' 4\")   Wt 120.2 kg (265 lb)   BMI 45.49 kg/m      Patient is alert, No acute distress, pleasant and conversational.    Gait: antalgic    left knee:   Skin intact. No erythema or ecchymosis.  No effusion or soft tissue swelling.    AROM: Zero to approximately 135  with pain on terminal flexion and extension    Palpation:   Mild anterior medial joint line tenderness  No medial or lateral facet joint tenderness.  No posterior medial or posterior lateral joint line tenderness     Special Tests:  Negative bounce test, negative forced flexion and negative Amanda's.  No ligamentous laxity or pain with valgus or varus stress.  Negative Lachman's, Anterior Drawer and Posterior Drawer     Full Isometric quad strength, extensor mechanism in place     Neurovascularly intact in the lower extremity    Hip and Ankle with full AROM and nontender      Imaging: independently reviewed outside images of bilateral knees from [facility] dated 9/16/2020 demonstrating moderate medial compartment OA and severe patellofemoral OA, left worse than right. Formal report available in care everywhere.       Assessment: Patient is a 75 year old female with anterior medial left knee pain that is likely secondary to OA    Recommendations:   Reviewed imaging and assessment with the patient in detail  Discussed options including, rest, icing bracing, otc medications such as acetaminophen and topical diclofenac and steroid injection. Patient would like to pursue steroid injection. See procedure note for details.   Routine post injection instructions are given.     Dannie Barber MD              Large Joint Injection: L knee joint    Date/Time: 9/23/2020 12:19 " PM  Performed by: Dannie Barber MD  Authorized by: Dannie Barber MD     Indications:  Pain  Needle Size:  22 G  Guidance: landmark guided    Approach:  Anterolateral  Location:  Knee      Medications:  4 mL lidocaine (PF) 1 %; 40 mg triamcinolone 40 MG/ML  Procedure discussed: discussed risks, benefits, and alternatives    Consent Given by:  Patient  Timeout: timeout called immediately prior to procedure    Prep: patient was prepped and draped in usual sterile fashion     There were no complications. The patient tolerated the procedure well. There was minimal bleeding.   The patient was instructed to ice the knee upon leaving clinic and refrain from overuse over the next 2 days.   The patient was instructed to go to the emergency room with any unusual pain, swelling, or redness occurred in the injected area.     Dannie Barber MD

## 2020-09-23 NOTE — NURSING NOTE
[address] [address]  Dept: [phone]  ______________________________________________________________________________    Patient: Yashira Son   : 1945   MRN: [id number]   2020    INVASIVE PROCEDURE SAFETY CHECKLIST    Date: 2020  Procedure: Left knee kenalog injection  Patient Name: Yashira Son  MRN: [id number]  YOB: 1945    Action: Complete sections as appropriate. Any discrepancy results in a HARD COPY until resolved.     PRE PROCEDURE:  Patient ID verified with 2 identifiers (name and  or MRN): Yes  Procedure and site verified with patient/designee (when able): Yes  Accurate consent documentation in medical record: Yes  H&P (or appropriate assessment) documented in medical record: Yes  H&P must be up to 20 days prior to procedure and updates within 24 hours of procedure as applicable: NA  Relevant diagnostic and radiology test results appropriately labeled and displayed as applicable: Yes  Procedure site(s) marked with provider initials: NA    TIMEOUT:  Time-Out performed immediately prior to starting procedure, including verbal and active participation of all team members addressing the following:Yes  * Correct patient identify  * Confirmed that the correct side and site are marked  * An accurate procedure consent form  * Agreement on the procedure to be done  * Correct patient position  * Relevant images and results are properly labeled and appropriately displayed  * The need to administer antibiotics or fluids for irrigation purposes during the procedure as applicable   * Safety precautions based on patient history or medication use    DURING PROCEDURE: Verification of correct person, site, and procedures any time the responsibility for care of the patient is transferred to another member of the care team.     The following medication was given:     MEDICATION:  Kenalog 40  mg  ROUTE: intra-articular  SITE: left Knee  DOSE: 1mL  LOT #: [id number]  : [employer]  EXPIRATION DATE: 04/01/2022  NDC#: [address]-5   Was there drug waste? No    MEDICATION:  Lidocaine without epinephrine  ROUTE: intra-articular  SITE: left Knee  DOSE: 4mL  LOT #: [id number]  : "[employer]  EXPIRATION DATE: 02/2024  NDC#: 32703-003-27   Was there drug waste? Yes  Amount of drug waste (mL): 1.  Reason for waste:  as per MD    Prior to injection, verified patient identity using patient's name and date of birth.  Due to injection administration, patient instructed to remain in clinic for 15 minutes  afterwards, and to report any adverse reaction to me immediately.    Tatiana Cook, NOBLE  September 23, 2020

## 2020-09-23 NOTE — PROGRESS NOTES
SPORTS & ORTHOPEDIC WALK-IN VISIT 9/23/2020    Primary Care Physician: Dr. Wolfe    Here today for left knee pain. ~8/3/20 - started to experience increased L knee pain suddenly without any injury or overuse. She mentions that this pain is much worse than pain she has experienced in the past. Seen and evaluated recently at [facility] and dx with OA. reports not begin given much advice or follow up information. Would like to discuss options further.     Most of her pain is noticed medially. Also notices pain travel distally along the anterior aspect of her L tibia. Pain is more tolerable as she is standing/walking. Pain is worst with sitting or laying.        Reason for visit:     What part of your body is injured / painful?  left knee    What caused the injury /pain? Unsure    How long ago did your injury occur or pain begin? several months ago    What are your most bothersome symptoms? Pain    How would you characterize your symptom?  aching and dull    What makes your symptoms better? Rest and Other: some movement    What makes your symptoms worse? Other: transitioning from sitting to standing    Have you been previously seen for this problem? PCP and [facility]    Medical History:    Any recent changes to your medical history? No    Any new medication prescribed since last visit? No    Have you had surgery on this body part before? No    Review of Systems:    Do you have fever, chills, weight loss? No    Do you have any vision problems? No    Do you have any chest pain or edema? No    Do you have any shortness of breath or wheezing?  Yes, Normal after activity    Do you have stomach problems? No    Do you have any numbness or focal weakness? No    Do you have diabetes? Yes, pre-diabetic    Do you have problems with bleeding or clotting? Yes, warfarin    Do you have an rashes or other skin lesions? No         Past Medical History, Current Medications, and Allergies are reviewed in the electronic medical record as  "appropriate.       EXAM:Ht 1.626 m (5' 4\")   Wt 120.2 kg (265 lb)   BMI 45.49 kg/m      Patient is alert, No acute distress, pleasant and conversational.    Gait: antalgic    left knee:   Skin intact. No erythema or ecchymosis.  No effusion or soft tissue swelling.    AROM: Zero to approximately 135  with pain on terminal flexion and extension    Palpation:   Mild anterior medial joint line tenderness  No medial or lateral facet joint tenderness.  No posterior medial or posterior lateral joint line tenderness     Special Tests:  Negative bounce test, negative forced flexion and negative Amanda's.  No ligamentous laxity or pain with valgus or varus stress.  Negative Lachman's, Anterior Drawer and Posterior Drawer     Full Isometric quad strength, extensor mechanism in place     Neurovascularly intact in the lower extremity    Hip and Ankle with full AROM and nontender      Imaging: independently reviewed outside images of bilateral knees from [facility] dated 9/16/2020 demonstrating moderate medial compartment OA and severe patellofemoral OA, left worse than right. Formal report available in care everywhere.       Assessment: Patient is a 75 year old female with anterior medial left knee pain that is likely secondary to OA    Recommendations:   Reviewed imaging and assessment with the patient in detail  Discussed options including, rest, icing bracing, otc medications such as acetaminophen and topical diclofenac and steroid injection. Patient would like to pursue steroid injection. See procedure note for details.   Routine post injection instructions are given.     Dannie Barber MD            "

## 2020-09-23 NOTE — PATIENT INSTRUCTIONS
Ice to knee today and then daily as needed  May use tylenol as needed.   Consider using topical diclofenac gel. This is available over the counter.   No pools or hot tubs for 48 hours  Avoid vigorous activity for 48 hours after.  Then advance as tolerated.  May do Physical Therapy as tolerated  Follow up as needed  Return to clinic with severe pain, warmth from the joint, or redness, as these may be signs of infection after your injection.

## 2020-09-23 NOTE — PROGRESS NOTES
Large Joint Injection: L knee joint    Date/Time: 9/23/2020 12:19 PM  Performed by: Dannie Barber MD  Authorized by: Dannie Barber MD     Indications:  Pain  Needle Size:  22 G  Guidance: landmark guided    Approach:  Anterolateral  Location:  Knee      Medications:  4 mL lidocaine (PF) 1 %; 40 mg triamcinolone 40 MG/ML  Procedure discussed: discussed risks, benefits, and alternatives    Consent Given by:  Patient  Timeout: timeout called immediately prior to procedure    Prep: patient was prepped and draped in usual sterile fashion     There were no complications. The patient tolerated the procedure well. There was minimal bleeding.   The patient was instructed to ice the knee upon leaving clinic and refrain from overuse over the next 2 days.   The patient was instructed to go to the emergency room with any unusual pain, swelling, or redness occurred in the injected area.     Dannie Barber MD

## 2020-09-28 RX ORDER — LIDOCAINE HYDROCHLORIDE 10 MG/ML
4 INJECTION, SOLUTION EPIDURAL; INFILTRATION; INTRACAUDAL; PERINEURAL
Status: SHIPPED | OUTPATIENT
Start: 2020-09-23

## 2020-09-28 RX ORDER — TRIAMCINOLONE ACETONIDE 40 MG/ML
40 INJECTION, SUSPENSION INTRA-ARTICULAR; INTRAMUSCULAR
Status: SHIPPED | OUTPATIENT
Start: 2020-09-23

## 2020-10-01 ENCOUNTER — TELEPHONE (OUTPATIENT)
Dept: ORTHOPEDICS | Facility: CLINIC | Age: 75
End: 2020-10-01

## 2020-10-01 NOTE — TELEPHONE ENCOUNTER
HEATHER with walk in clinic number for Yashira to call back and let us know how she is feeling since her ED visit.    Dr. Barber would like to see if she would like to do a virtual visit or in person if she would like.     - Xu RON ATC

## 2020-11-23 PROBLEM — Z79.4 TYPE 2 DIABETES MELLITUS, WITH LONG-TERM CURRENT USE OF INSULIN (H): Status: ACTIVE | Noted: 2020-11-23

## 2020-11-23 PROBLEM — E11.9 TYPE 2 DIABETES MELLITUS, WITH LONG-TERM CURRENT USE OF INSULIN (H): Status: ACTIVE | Noted: 2020-11-23

## 2020-11-23 PROBLEM — D05.11 DUCTAL CARCINOMA IN SITU (DCIS) OF RIGHT BREAST: Status: ACTIVE | Noted: 2020-11-23

## 2020-11-23 NOTE — PROGRESS NOTES
"Yashira Son is a 75 year old female who is being evaluated via a billable telephone visit.      The patient has been notified of following:     \"This telephone visit will be conducted via a call between you and your physician/provider. We have found that certain health care needs can be provided without the need for a physical exam.  This service lets us provide the care you need with a short phone conversation.  If a prescription is necessary we can send it directly to your pharmacy.  If lab work is needed we can place an order for that and you can then stop by our lab to have the test done at a later time.    Telephone visits are billed at different rates depending on your insurance coverage. During this emergency period, for some insurers they may be billed the same as an in-person visit.  Please reach out to your insurance provider with any questions.    If during the course of the call the physician/provider feels a telephone visit is not appropriate, you will not be charged for this service.\"    Patient has given verbal consent for Telephone visit?  Yes    What phone number would you like to be contacted at?   [phone]    How would you like to obtain your AVS? 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 She completed chemotherapy 2016.   She is here today for a surveillance visit.     Oncology history:    Ms. Son originally presented with post-menopausal bleeding in 2016 with atypical glandular cell pap in 2015. Biopsy revealed adenocarcinoma and the patient proceeded with subsequent robotic hysterectomy, BSO and cancer staging on 12/18/15. No malignancy seen at time of procedure and peritoneal surfaces and " omentum were clear. Final pathology report showed positive washings, a 2.1 cm noninvasive high-grade serous endometrial cancer, and benign polyps. + fibroids. DNA mismatch repair intact showing this is not due to hoyos. ER receptor was 66% positive and ID receptor was 38% positive --> stage IA high-grade serous endometrial cancer with positive washings. Patient underwent 6 cycles of chemotherapy, completed 5/12/16. Dose reduction of Taxol done during cycle #4 due to neuropathy of fingers and toes. CT scan was negative on 6/12/16. Other notable history includes: atrial fibrillation on coumadin, morbid obesity, adult-onset diabetes, asthma and hyperlipidemia.     12/18/15: DaVinci TLH/BSO/bilateral PPALND/omental biopsy with Dr. Brunner for high grade serous endometrial cancer.      PATH REREAD:A. UTERUS, FALLOPIAN TUBES AND OVARIES, HYSTERECTOMY AND BILATERAL   SALPINGO-OOPHORECTOMY:   - Endometrial serous carcinoma   - Maximum tumor size 2.1cm   - Myometrial invasion absent   - Lower uterine segment, cervix and uterine serosa - uninvolved   - Lymphovascular invasion - absent   - Margins negative for tumor   - Ovary and fallopian tubes negative for carcinoma.   - Leiomyomas     B. LYMPH NODES, RIGHT PERIAORTIC, DISSECTION:   - Two lymph nodes negative for carcinoma (0/2)     C. LYMPH NODE, LEFT PERIAORTIC, DISSECTION:   - One lymph nodes negative for carcinoma (0/1)     D. LYMPH NODES, LEFT PELVIC, DISSECTION:   - Two lymph nodes negative for carcinoma (0/2)     E. LYMPH NODES, RIGHT PARA-AORTIC DISSECTION:   - Nine lymph nodes negative for carcinoma (0/9)     F. OMENTUM, OMENTECTOMY:   - Adipose tissue negative for carcinoma     COMMENT:   Tumor cells are focally positive for estrogen receptor (5%) and negative for progesterone receptor. DNA mismatch repair enzymes are intact. Immunostains performed in the outside institution are reviewed in-house.      Paclitaxel/Carboplatin x 6 cycles completed 5/12/16 at outside  facility.       CT C/A/P at OSH (6/2016): negative for masses except stable left lung nodule      She was lost to follow up until 8/2017.        Today she reports she is doing well and denies any concerns for her visit. She denies any vaginal bleeding, no changes in her bowel or bladder habits, no nausea/emesis, no lower extremity edema, and no difficulties eating or sleeping. She denies any abdominal discomfort/bloating, no fevers or chills, and no chest pain or shortness of breath. She will be having her mammogram next month, her colonoscopy is due in 3 years, she had a DEXA a year ago and is on Fosamax, and her annual exam is current. She is not sexually active and denies dryness. Her BG have been running 100-110. She was having left knee pain for which she saw an acupuncturist twice a week and this helped.       Review of Systems:    Systemic           no weight changes; no fever; no chills; no night sweats; no appetite changes  Skin           no rashes, or lesions  Eye           no irritation; no changes in vision  [place]-Laryngeal           no dysphagia; no hoarseness   Pulmonary    no cough; no shortness of breath  Cardiovascular    no chest pain; no palpitations  Gastrointestinal    no diarrhea; no constipation; no abdominal pain; no changes in bowel habits; no blood in stool  Genitourinary   no urinary frequency; no urinary urgency; no dysuria; no pain; no abnormal vaginal discharge; no abnormal vaginal bleeding  Breast    no breast discharge; no breast changes; no breast pain  Musculoskeletal    no myalgias; no arthralgias; no back pain  Psychiatric           no depressed mood; no anxiety    Hematologic              no tender lymph nodes; no noticeable swellings or lumps   Endocrine    no hot flashes; no heat/cold intolerance; + DM         Neurological   no tremor; no numbness and tingling; no headaches; no difficulty sleeping      Past Medical History:    Past Medical History:   Diagnosis Date     Anxiety       Asthma      Atrial fibrillation (H)      Depression      Endometrial cancer (H)      Hyperlipidemia      RONNY (obstructive sleep apnea)      Prediabetes          Past Surgical History:    Past Surgical History:   Procedure Laterality Date     DAVINCI HYSTERECTOMY TOTAL, BILATERAL SALPINGO-OOPHORECTOMY, NODE DISSECTION, COMBINED       oophorectomy Right     XL, dermoid, in 1981     THYROIDECTOMY      subtotal         Health Maintenance Due   Topic Date Due     A1C  1945     BMP  1945     LIPID  1945     MICROALBUMIN  1945     DIABETIC FOOT EXAM  1945     ADVANCE CARE PLANNING  1945     EYE EXAM  1945     HEPATITIS C SCREENING  07/23/1961     ZOSTER IMMUNIZATION (1 of 2) 07/23/1993     MEDICARE ANNUAL WELLNESS VISIT  07/23/2008     FALL RISK ASSESSMENT  10/04/2020       Current Medications:     Current Outpatient Medications   Medication Sig Dispense Refill     ACCU-CHEK SHIRLEY PLUS test strip   1     alendronate (FOSAMAX) 35 MG tablet TK 1 T PO Q WEEK  2     alendronate (FOSAMAX) 70 MG tablet Take 70 mg by mouth once a week       atorvastatin (LIPITOR) 20 MG tablet Take 20 mg by mouth daily        B Complex-Biotin-FA (B-50 COMPLEX PO) Take by mouth daily        BD AMOS U/F 32G X 4 MM insulin pen needle USE UTD WITH VICTOZA PEN  2     blood glucose monitoring (ACCU-CHEK SHIRLEY PLUS) meter device kit USE UTD  0     blood glucose monitoring (SOFTCLIX) lancets TK UTD  11     buPROPion (WELLBUTRIN XL) 150 MG 24 hr tablet TK 1 T PO QAM FOR DEPRESSION  3     calcium-magnesium (CALMAG) 500-250 MG TABS per tablet Take 1 tablet by mouth 2 times daily (with meals)       cholecalciferol (VITAMIN D3) 5000 UNITS TABS tablet Take 5,000 Units by mouth daily       DEXILANT 60 MG CPDR CR capsule TK ONE C PO QAM FOR GASTRIC REFLUX  1     dexlansoprazole (DEXILANT) 30 MG CPDR CR capsule Take 60 mg by mouth daily        escitalopram (LEXAPRO) 20 MG tablet Take 20 mg by mouth daily         exemestane (AROMASIN) 25 MG tablet 25 mg daily        fluticasone-salmeterol (ADVAIR DISKUS) 250-50 MCG/DOSE diskus inhaler Inhale 1 puff into the lungs 2 times daily        furosemide (LASIX) 20 MG tablet TK 2 TS PO D IF WEIGHT UP BY 3 POUNDS FROM BASELINE AND HAVE SWELLING IN LOWER EXTREMITIES. OTHERWISE TK 1 T D.  1     ibuprofen (ADVIL/MOTRIN) 800 MG tablet Take 800 mg by mouth 3 times daily as needed        lisinopril (PRINIVIL/ZESTRIL) 2.5 MG tablet TK 1 T PO QAM FOR KIDNEY PROTECTION  4     metFORMIN (GLUCOPHAGE) 500 MG tablet Take 500 mg by mouth 2 times daily (with meals)        metFORMIN (GLUCOPHAGE-XR) 500 MG 24 hr tablet TK 3 TS PO D WF  5     metoprolol (LOPRESSOR) 25 MG tablet Take 25 mg by mouth 2 times daily        montelukast (SINGULAIR) 10 MG tablet Take 10 mg by mouth At Bedtime       ondansetron (ZOFRAN) 4 MG tablet TK 1 T PO Q 8 H PRF NAUSEA  5     oxybutynin (DITROPAN-XL) 10 MG 24 hr tablet Take 10 mg by mouth daily        PROCHLORPERAZINE MALEATE PO Take 10 mg by mouth every 6 hours as needed for nausea or vomiting       tiZANidine (ZANAFLEX) 2 MG tablet   2     TiZANidine HCl 2 MG CAPS Take 2 mg by mouth every 6 hours as needed        VENTOLIN  (90 Base) MCG/ACT inhaler INHALE 2 PUFFS Q 4 H PRF WHEEZING  3     VICTOZA PEN 18 MG/3ML soln INJ 0.6 MG ONCE D FOR 2 WEEKS THEN 1.2 MG ONCE D FOR 2 WEEKS THEN 1.8 MG ONCE D  1     warfarin (COUMADIN) 5 MG tablet Take 5 mg by mouth daily 2.5 mg (1/2 tablet) [facility], whole tablet on Monday.       zolpidem (AMBIEN) 5 MG tablet Take 5 mg by mouth nightly as needed            Allergies:      No Known Allergies     Social History:     Social History     Tobacco Use     Smoking status: Former Smoker     Smokeless tobacco: Never Used     Tobacco comment: quite 40+ years ago    Substance Use Topics     Alcohol use: No     Comment: socially       History   Drug Use No         Family History:       Family History   Adopted: Yes         Physical Exam:      There were no vitals taken for this visit.  There is no height or weight on file to calculate BMI.    Respiratory: No audible wheeze, cough.      Psychiatric: appropriate mood and affect. Mentation appears normal, affect normal/bright, judgement and insight intact, normal speech       The rest of a comprehensive physical examination is deferred due to PHE (public health emergency) phone visit restrictions.        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 She completed chemotherapy 5/2016.   She is here today for a surveillance visit.       20 minutes were spent with this patient by phone. Over 50% of that time was spent in symptom management, treatment planning and in counseling and coordination of care. See rooming note for consent.       Plan:     1.)        Patient to RTC in 6 months for her next surveillance visit. Reviewed recommendations from [facility] not to perform surveillance pap smears in women diagnosed with endometrial cancer as this does not improve detection of local recurrence. Reviewed signs and symptoms for when she should contact the clinic or seek additional care. Patient to contact the clinic with any questions or concerns in the interim.  Answered all of her questions to the best of my ability.     2.) Genetic risk factors were assessed and the patient does meet the qualifications for a referral now given her recent diagnosis of DCIS; she will do this through her medical oncology.    3.) Labs and/or tests ordered include:  None.      4.) Health maintenance issues addressed today include annual health maintenance and non-gynecologic issues with PCP.    NTAHAN Sneed, WHNP-BC, ANP-BC  Women's Health Nurse Practitioner  Adult Nurse Pracitioner  Division of Gynecologic Oncology          CC  Patient Care Team:  Debra Wolfe NP as PCP - General (Nurse Practitioner)  Dannie Barber MD as Assigned  Musculoskeletal Provider  Adrianna Hendricks, APRN CNP as Assigned OBGYN Provider  SELF, REFERRED

## 2020-11-27 ENCOUNTER — VIRTUAL VISIT (OUTPATIENT)
Dept: ONCOLOGY | Facility: CLINIC | Age: 75
End: 2020-11-27
Attending: NURSE PRACTITIONER
Payer: MEDICARE

## 2020-11-27 DIAGNOSIS — D05.11 DUCTAL CARCINOMA IN SITU (DCIS) OF RIGHT BREAST: ICD-10-CM

## 2020-11-27 DIAGNOSIS — C54.1 ENDOMETRIAL CANCER (H): Primary | ICD-10-CM

## 2020-11-27 DIAGNOSIS — I15.9 SECONDARY HYPERTENSION: ICD-10-CM

## 2020-11-27 PROCEDURE — 99442 PR PHYSICIAN TELEPHONE EVALUATION 11-20 MIN: CPT | Mod: 95 | Performed by: NURSE PRACTITIONER

## 2020-11-27 PROCEDURE — 999N001193 HC VIDEO/TELEPHONE VISIT; NO CHARGE

## 2020-11-27 NOTE — LETTER
"    2020         RE: Yashira Son  [address]  [address]  [facility] [address]        Dear Colleague,    Thank you for referring your patient, Yashira Son, to the [facility]. Please see a copy of my visit note below.    Yashira Son is a 75 year old female who is being evaluated via a billable telephone visit.      The patient has been notified of following:     \"This telephone visit will be conducted via a call between you and your physician/provider. We have found that certain health care needs can be provided without the need for a physical exam.  This service lets us provide the care you need with a short phone conversation.  If a prescription is necessary we can send it directly to your pharmacy.  If lab work is needed we can place an order for that and you can then stop by our lab to have the test done at a later time.    Telephone visits are billed at different rates depending on your insurance coverage. During this emergency period, for some insurers they may be billed the same as an in-person visit.  Please reach out to your insurance provider with any questions.    If during the course of the call the physician/provider feels a telephone visit is not appropriate, you will not be charged for this service.\"    Patient has given verbal consent for Telephone visit?  Yes    What phone number would you like to be contacted at?   [phone]    How would you like to obtain your AVS? 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 She completed chemotherapy 2016.   She is here today for a surveillance visit.     Oncology history:    Ms. Son originally presented with post-menopausal bleeding in " October 2016 with atypical glandular cell pap in July 2015. Biopsy revealed adenocarcinoma and the patient proceeded with subsequent robotic hysterectomy, BSO and cancer staging on 12/18/15. No malignancy seen at time of procedure and peritoneal surfaces and omentum were clear. Final pathology report showed positive washings, a 2.1 cm noninvasive high-grade serous endometrial cancer, and benign polyps. + fibroids. DNA mismatch repair intact showing this is not due to hoyos. ER receptor was 66% positive and LA receptor was 38% positive --> stage IA high-grade serous endometrial cancer with positive washings. Patient underwent 6 cycles of chemotherapy, completed 5/12/16. Dose reduction of Taxol done during cycle #4 due to neuropathy of fingers and toes. CT scan was negative on 6/12/16. Other notable history includes: atrial fibrillation on coumadin, morbid obesity, adult-onset diabetes, asthma and hyperlipidemia.     12/18/15: [facility] TLH/BSO/bilateral PPALND/omental biopsy with Dr. Brunner for high grade serous endometrial cancer.      PATH REREAD:A. UTERUS, FALLOPIAN TUBES AND OVARIES, HYSTERECTOMY AND BILATERAL   SALPINGO-OOPHORECTOMY:   - Endometrial serous carcinoma   - Maximum tumor size 2.1cm   - Myometrial invasion absent   - Lower uterine segment, cervix and uterine serosa - uninvolved   - Lymphovascular invasion - absent   - Margins negative for tumor   - Ovary and fallopian tubes negative for carcinoma.   - Leiomyomas     B. LYMPH NODES, RIGHT PERIAORTIC, DISSECTION:   - Two lymph nodes negative for carcinoma (0/2)     C. LYMPH NODE, LEFT PERIAORTIC, DISSECTION:   - One lymph nodes negative for carcinoma (0/1)     D. LYMPH NODES, LEFT PELVIC, DISSECTION:   - Two lymph nodes negative for carcinoma (0/2)     E. LYMPH NODES, RIGHT PARA-AORTIC DISSECTION:   - Nine lymph nodes negative for carcinoma (0/9)     F. OMENTUM, OMENTECTOMY:   - Adipose tissue negative for carcinoma     COMMENT:   Tumor cells are focally  positive for estrogen receptor (5%) and negative for progesterone receptor. DNA mismatch repair enzymes are intact. Immunostains performed in the outside institution are reviewed in-house.      Paclitaxel/Carboplatin x 6 cycles completed 5/12/16 at outside facility.       CT C/A/P at OSH (6/2016): negative for masses except stable left lung nodule      She was lost to follow up until 8/2017.        Today she reports she is doing well and denies any concerns for her visit. She denies any vaginal bleeding, no changes in her bowel or bladder habits, no nausea/emesis, no lower extremity edema, and no difficulties eating or sleeping. She denies any abdominal discomfort/bloating, no fevers or chills, and no chest pain or shortness of breath. She will be having her mammogram next month, her colonoscopy is due in 3 years, she had a DEXA a year ago and is on Fosamax, and her annual exam is current. She is not sexually active and denies dryness. Her BG have been running 100-110. She was having left knee pain for which she saw an acupuncturist twice a week and this helped.       Review of Systems:    Systemic           no weight changes; no fever; no chills; no night sweats; no appetite changes  Skin           no rashes, or lesions  Eye           no irritation; no changes in vision  [place]-Laryngeal           no dysphagia; no hoarseness   Pulmonary    no cough; no shortness of breath  Cardiovascular    no chest pain; no palpitations  Gastrointestinal    no diarrhea; no constipation; no abdominal pain; no changes in bowel habits; no blood in stool  Genitourinary   no urinary frequency; no urinary urgency; no dysuria; no pain; no abnormal vaginal discharge; no abnormal vaginal bleeding  Breast    no breast discharge; no breast changes; no breast pain  Musculoskeletal    no myalgias; no arthralgias; no back pain  Psychiatric           no depressed mood; no anxiety    Hematologic              no tender lymph nodes; no noticeable  swellings or lumps   Endocrine    no hot flashes; no heat/cold intolerance; + DM         Neurological   no tremor; no numbness and tingling; no headaches; no difficulty sleeping      Past Medical History:    Past Medical History:   Diagnosis Date     Anxiety      Asthma      Atrial fibrillation (H)      Depression      Endometrial cancer (H)      Hyperlipidemia      RONNY (obstructive sleep apnea)      Prediabetes          Past Surgical History:    Past Surgical History:   Procedure Laterality Date     DAVINCI HYSTERECTOMY TOTAL, BILATERAL SALPINGO-OOPHORECTOMY, NODE DISSECTION, COMBINED       oophorectomy Right     XL, dermoid, in 1981     THYROIDECTOMY      subtotal         Health Maintenance Due   Topic Date Due     A1C  1945     BMP  1945     LIPID  1945     MICROALBUMIN  1945     DIABETIC FOOT EXAM  1945     ADVANCE CARE PLANNING  1945     EYE EXAM  1945     HEPATITIS C SCREENING  07/23/1961     ZOSTER IMMUNIZATION (1 of 2) 07/23/1993     MEDICARE ANNUAL WELLNESS VISIT  07/23/2008     FALL RISK ASSESSMENT  10/04/2020       Current Medications:     Current Outpatient Medications   Medication Sig Dispense Refill     ACCU-CHEK SHIRLEY PLUS test strip   1     alendronate (FOSAMAX) 35 MG tablet TK 1 T PO Q WEEK  2     alendronate (FOSAMAX) 70 MG tablet Take 70 mg by mouth once a week       atorvastatin (LIPITOR) 20 MG tablet Take 20 mg by mouth daily        B Complex-Biotin-FA (B-50 COMPLEX PO) Take by mouth daily        BD AMOS U/F 32G X 4 MM insulin pen needle USE UTD WITH VICTOZA PEN  2     blood glucose monitoring (ACCU-CHEK SHIRLEY PLUS) meter device kit USE UTD  0     blood glucose monitoring (SOFTCLIX) lancets TK UTD  11     buPROPion (WELLBUTRIN XL) 150 MG 24 hr tablet TK 1 T PO QAM FOR DEPRESSION  3     calcium-magnesium (CALMAG) 500-250 MG TABS per tablet Take 1 tablet by mouth 2 times daily (with meals)       cholecalciferol (VITAMIN D3) 5000 UNITS TABS tablet Take  5,000 Units by mouth daily       DEXILANT 60 MG CPDR CR capsule TK ONE C PO QAM FOR GASTRIC REFLUX  1     dexlansoprazole (DEXILANT) 30 MG CPDR CR capsule Take 60 mg by mouth daily        escitalopram (LEXAPRO) 20 MG tablet Take 20 mg by mouth daily        exemestane (AROMASIN) 25 MG tablet 25 mg daily        fluticasone-salmeterol (ADVAIR DISKUS) 250-50 MCG/DOSE diskus inhaler Inhale 1 puff into the lungs 2 times daily        furosemide (LASIX) 20 MG tablet TK 2 TS PO D IF WEIGHT UP BY 3 POUNDS FROM BASELINE AND HAVE SWELLING IN LOWER EXTREMITIES. OTHERWISE TK 1 T D.  1     ibuprofen (ADVIL/MOTRIN) 800 MG tablet Take 800 mg by mouth 3 times daily as needed        lisinopril (PRINIVIL/ZESTRIL) 2.5 MG tablet TK 1 T PO QAM FOR KIDNEY PROTECTION  4     metFORMIN (GLUCOPHAGE) 500 MG tablet Take 500 mg by mouth 2 times daily (with meals)        metFORMIN (GLUCOPHAGE-XR) 500 MG 24 hr tablet TK 3 TS PO D WF  5     metoprolol (LOPRESSOR) 25 MG tablet Take 25 mg by mouth 2 times daily        montelukast (SINGULAIR) 10 MG tablet Take 10 mg by mouth At Bedtime       ondansetron (ZOFRAN) 4 MG tablet TK 1 T PO Q 8 H PRF NAUSEA  5     oxybutynin (DITROPAN-XL) 10 MG 24 hr tablet Take 10 mg by mouth daily        PROCHLORPERAZINE MALEATE PO Take 10 mg by mouth every 6 hours as needed for nausea or vomiting       tiZANidine (ZANAFLEX) 2 MG tablet   2     TiZANidine HCl 2 MG CAPS Take 2 mg by mouth every 6 hours as needed        VENTOLIN  (90 Base) MCG/ACT inhaler INHALE 2 PUFFS Q 4 H PRF WHEEZING  3     VICTOZA PEN 18 MG/3ML soln INJ 0.6 MG ONCE D FOR 2 WEEKS THEN 1.2 MG ONCE D FOR 2 WEEKS THEN 1.8 MG ONCE D  1     warfarin (COUMADIN) 5 MG tablet Take 5 mg by mouth daily 2.5 mg (1/2 tablet) Tu-Su, whole tablet on Monday.       zolpidem (AMBIEN) 5 MG tablet Take 5 mg by mouth nightly as needed            Allergies:      No Known Allergies     Social History:     Social History     Tobacco Use     Smoking status: Former Smoker      Smokeless tobacco: Never Used     Tobacco comment: quite 40+ years ago    Substance Use Topics     Alcohol use: No     Comment: socially       History   Drug Use No         Family History:       Family History   Adopted: Yes         Physical Exam:     There were no vitals taken for this visit.  There is no height or weight on file to calculate BMI.    Respiratory: No audible wheeze, cough.      Psychiatric: appropriate mood and affect. Mentation appears normal, affect normal/bright, judgement and insight intact, normal speech       The rest of a comprehensive physical examination is deferred due to PHE (public health emergency) phone visit restrictions.        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 She completed chemotherapy 5/2016.   She is here today for a surveillance visit.       20 minutes were spent with this patient by phone. Over 50% of that time was spent in symptom management, treatment planning and in counseling and coordination of care. See rooming note for consent.       Plan:     1.)        Patient to RTC in 6 months for her next surveillance visit. Reviewed recommendations from SGO not to perform surveillance pap smears in women diagnosed with endometrial cancer as this does not improve detection of local recurrence. Reviewed signs and symptoms for when she should contact the clinic or seek additional care. Patient to contact the clinic with any questions or concerns in the interim.  Answered all of her questions to the best of my ability.     2.) Genetic risk factors were assessed and the patient does meet the qualifications for a referral now given her recent diagnosis of DCIS; she will do this through her medical oncology.    3.) Labs and/or tests ordered include:  None.      4.) Health maintenance issues addressed today include annual health maintenance and non-gynecologic issues with PCP.    NATHAN Sneed,  WHNP-BC, ANP-BC  Women's Health Nurse Practitioner  Adult Nurse Pracitioner  Division of Gynecologic Oncology          CC  Patient Care Team:  Debra Wolfe NP as PCP - General (Nurse Practitioner)  Dannie Barber MD as Assigned Musculoskeletal Provider  Adrianna Hendricks APRN CNP as Assigned OBGYN Provider  SELF, REFERRED      Again, thank you for allowing me to participate in the care of your patient.        Sincerely,        NATHAN James CNP

## 2021-07-04 NOTE — PROGRESS NOTES
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 She completed chemotherapy 2016.   She is here today for a surveillance visit.      Oncology history:     Ms. Son originally presented with post-menopausal bleeding in 2016 with atypical glandular cell pap in 2015. Biopsy revealed adenocarcinoma and the patient proceeded with subsequent robotic hysterectomy, BSO and cancer staging on 12/18/15. No malignancy seen at time of procedure and peritoneal surfaces and omentum were clear. Final pathology report showed positive washings, a 2.1 cm noninvasive high-grade serous endometrial cancer, and benign polyps. + fibroids. DNA mismatch repair intact showing this is not due to hoyos. ER receptor was 66% positive and CT receptor was 38% positive --> stage IA high-grade serous endometrial cancer with positive washings. Patient underwent 6 cycles of chemotherapy, completed 16. Dose reduction of Taxol done during cycle #4 due to neuropathy of fingers and toes. CT scan was negative on 16. Other notable history includes: atrial fibrillation on coumadin, morbid obesity, adult-onset diabetes, asthma and hyperlipidemia.     12/18/15: [facility] TLH/BSO/bilateral PPALND/omental biopsy with Dr. Brunner for high grade serous endometrial cancer.      PATH REREAD:A. UTERUS, FALLOPIAN TUBES AND OVARIES, HYSTERECTOMY AND BILATERAL   SALPINGO-OOPHORECTOMY:   - Endometrial serous carcinoma   - Maximum tumor size 2.1cm   - Myometrial invasion absent   - Lower uterine segment, cervix and uterine serosa - uninvolved   - Lymphovascular invasion - absent   - Margins negative for tumor   - Ovary and fallopian tubes negative for carcinoma.   - Leiomyomas     B. LYMPH NODES, RIGHT PERIAORTIC, DISSECTION:   - Two lymph nodes negative for carcinoma  "(0/2)     C. LYMPH NODE, LEFT PERIAORTIC, DISSECTION:   - One lymph nodes negative for carcinoma (0/1)     D. LYMPH NODES, LEFT PELVIC, DISSECTION:   - Two lymph nodes negative for carcinoma (0/2)     E. LYMPH NODES, RIGHT PARA-AORTIC DISSECTION:   - Nine lymph nodes negative for carcinoma (0/9)     F. OMENTUM, OMENTECTOMY:   - Adipose tissue negative for carcinoma     COMMENT:   Tumor cells are focally positive for estrogen receptor (5%) and negative for progesterone receptor. DNA mismatch repair enzymes are intact. Immunostains performed in the outside institution are reviewed in-house.      Paclitaxel/Carboplatin x 6 cycles completed 5/12/16 at outside facility.       CT C/A/P at OSH (6/2016): negative for masses except stable left lung nodule      She was lost to follow up until 8/2017.        Today she comes to clinic and denies any concerns for her visit. She denies any vaginal bleeding, no changes in her bowel or bladder habits, no nausea/emesis, no lower extremity edema, and no difficulties eating or sleeping. She denies any abdominal discomfort/bloating, no fevers or chills, and no chest pain or shortness of breath. She is going to be starting radiation for her breast cancer and is concerned about that. She is current with her health screenings. She is not sexually active. She monitors her BG at home and reports readings of 110-130. 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           Review of Systems:    Systemic           no weight changes; no fever; no chills; no night sweats; no appetite changes  Skin           no rashes, or lesions  Eye           no irritation; no changes in vision  [place]-Laryngeal           no dysphagia; no hoarseness   Pulmonary    no cough; no shortness of breath  Cardiovascular    no chest pain; no palpitations  Gastrointestinal    no diarrhea; no constipation; no abdominal pain; no changes in " bowel habits; no blood in stool  Genitourinary   no urinary frequency; no urinary urgency; no dysuria; no pain; no abnormal vaginal discharge; no abnormal vaginal bleeding  Breast    no breast discharge; no breast changes; no breast pain  Musculoskeletal    no myalgias; no arthralgias; no back pain  Psychiatric           no depressed mood; no anxiety    Hematologic              no tender lymph nodes; no noticeable swellings or lumps   Endocrine    no hot flashes; no heat/cold intolerance; + BG         Neurological   no tremor; no numbness and tingling; no headaches; no difficulty sleeping      Past Medical History:    Past Medical History:   Diagnosis Date     Anxiety      Asthma      Atrial fibrillation (H)      Depression      Endometrial cancer (H)      Hyperlipidemia      RONNY (obstructive sleep apnea)      Prediabetes          Past Surgical History:    Past Surgical History:   Procedure Laterality Date     DAVINCI HYSTERECTOMY TOTAL, BILATERAL SALPINGO-OOPHORECTOMY, NODE DISSECTION, COMBINED       oophorectomy Right     XL, dermoid, in 1981     THYROIDECTOMY      subtotal         Health Maintenance Due   Topic Date Due     A1C  Never done     BMP  Never done     DEXA  Never done     LIPID  Never done     MICROALBUMIN  Never done     DIABETIC FOOT EXAM  Never done     ADVANCE CARE PLANNING  Never done     EYE EXAM  Never done     HEPATITIS C SCREENING  Never done     ZOSTER IMMUNIZATION (1 of 2) Never done     MEDICARE ANNUAL WELLNESS VISIT  Never done     FALL RISK ASSESSMENT  10/04/2020     PHQ-2  01/01/2021       Current Medications:     Current Outpatient Medications   Medication Sig Dispense Refill     ACCU-CHEK SHIRLEY PLUS test strip   1     alendronate (FOSAMAX) 35 MG tablet TK 1 T PO Q WEEK  2     alendronate (FOSAMAX) 70 MG tablet Take 70 mg by mouth once a week       atorvastatin (LIPITOR) 20 MG tablet Take 20 mg by mouth daily        B Complex-Biotin-FA (B-50 COMPLEX PO) Take by mouth daily        BD  AMOS U/F 32G X 4 MM insulin pen needle USE UTD WITH VICTOZA PEN  2     blood glucose monitoring (ACCU-CHEK SHIRLEY PLUS) meter device kit USE UTD  0     blood glucose monitoring (SOFTCLIX) lancets TK UTD  11     buPROPion (WELLBUTRIN XL) 150 MG 24 hr tablet TK 1 T PO QAM FOR DEPRESSION  3     calcium-magnesium (CALMAG) 500-250 MG TABS per tablet Take 1 tablet by mouth 2 times daily (with meals)       cholecalciferol (VITAMIN D3) 5000 UNITS TABS tablet Take 5,000 Units by mouth daily       DEXILANT 60 MG CPDR CR capsule TK ONE C PO QAM FOR GASTRIC REFLUX  1     dexlansoprazole (DEXILANT) 30 MG CPDR CR capsule Take 60 mg by mouth daily        escitalopram (LEXAPRO) 20 MG tablet Take 20 mg by mouth daily        exemestane (AROMASIN) 25 MG tablet 25 mg daily        fluticasone-salmeterol (ADVAIR DISKUS) 250-50 MCG/DOSE diskus inhaler Inhale 1 puff into the lungs 2 times daily        furosemide (LASIX) 20 MG tablet TK 2 TS PO D IF WEIGHT UP BY 3 POUNDS FROM BASELINE AND HAVE SWELLING IN LOWER EXTREMITIES. OTHERWISE TK 1 T D.  1     ibuprofen (ADVIL/MOTRIN) 800 MG tablet Take 800 mg by mouth 3 times daily as needed        lisinopril (PRINIVIL/ZESTRIL) 2.5 MG tablet TK 1 T PO QAM FOR KIDNEY PROTECTION  4     metFORMIN (GLUCOPHAGE) 500 MG tablet Take 500 mg by mouth 2 times daily (with meals)        metFORMIN (GLUCOPHAGE-XR) 500 MG 24 hr tablet TK 3 TS PO D WF  5     metoprolol (LOPRESSOR) 25 MG tablet Take 25 mg by mouth 2 times daily        montelukast (SINGULAIR) 10 MG tablet Take 10 mg by mouth At Bedtime       ondansetron (ZOFRAN) 4 MG tablet TK 1 T PO Q 8 H PRF NAUSEA  5     oxybutynin (DITROPAN-XL) 10 MG 24 hr tablet Take 10 mg by mouth daily        PROCHLORPERAZINE MALEATE PO Take 10 mg by mouth every 6 hours as needed for nausea or vomiting       tiZANidine (ZANAFLEX) 2 MG tablet   2     TiZANidine HCl 2 MG CAPS Take 2 mg by mouth every 6 hours as needed        VENTOLIN  (90 Base) MCG/ACT inhaler INHALE 2  "PUFFS Q 4 H PRF WHEEZING  3     VICTOZA PEN 18 MG/3ML soln INJ 0.6 MG ONCE D FOR 2 WEEKS THEN 1.2 MG ONCE D FOR 2 WEEKS THEN 1.8 MG ONCE D  1     warfarin (COUMADIN) 5 MG tablet Take 5 mg by mouth daily 2.5 mg (1/2 tablet) Tu-Su, whole tablet on Monday.       zolpidem (AMBIEN) 5 MG tablet Take 5 mg by mouth nightly as needed            Allergies:      No Known Allergies     Social History:     Social History     Tobacco Use     Smoking status: Former Smoker     Smokeless tobacco: Never Used     Tobacco comment: quite 40+ years ago    Substance Use Topics     Alcohol use: No     Comment: socially       History   Drug Use No         Family History:     The patient's family history is notable for:    Family History   Adopted: Yes         Physical Exam:     /87 (BP Location: Right arm, Patient Position: Sitting, Cuff Size: Adult Large)   Pulse 97   Temp 98  F (36.7  C) (Oral)   Resp 18   Ht 1.628 m (5' 4.09\")   Wt 117.2 kg (258 lb 4.8 oz)   SpO2 95%   BMI 44.21 kg/m    Body mass index is 44.21 kg/m .    General Appearance: healthy and alert, no distress     HEENT: no thyromegaly, no palpable nodules or masses        Cardiovascular: regular rate and rhythm, no gallops, rubs or murmurs     Respiratory: lungs clear, no rales, rhonchi or wheezes, normal diaphragmatic excursion    Musculoskeletal: extremities non tender and without edema    Skin: no lesions or rashes     Neurological: normal gait, no gross defects     Psychiatric: appropriate mood and affect                               Hematological: normal cervical, supraclavicular and inguinal lymph nodes     Gastrointestinal:       abdomen soft, non-tender, non-distended, no organomegaly or masses    Genitourinary: External genitalia and urethral meatus appears normal.  Vagina is smooth without nodularity or masses.  Cervix surgically absent.  Bimanual exam reveal no masses, nodularity or fullness.  Recto-vaginal exam confirms these " findings.      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 She completed chemotherapy 5/2016.   She is here today for a surveillance visit.     21 minutes spent on the date of the encounter doing chart review, history and exam, documentation and further activities as noted above      Plan:     1.)        She is now 5 years out from treatment completion and can extend her surveillance to annually; this can be done here or with her local provider; she will call here to schedule an appointment if one is needed. Reviewed recommendations from SGO not to perform surveillance pap smears in women diagnosed with endometrial cancer as this does not improve detection of local recurrence. Reviewed signs and symptoms for when she should contact the clinic or seek additional care.   Patient to contact the clinic with any questions or concerns in the interim.  Answered all of her questions to the best of my ability.     2.) Genetic risk factors were assessed and her MMR proteins were intact/normal. She had a complete Multi Cancer panel of 84 genes via [facility] and she was negative. No clinically-actionable (pathogenic) mutations or other variants were detected in the genes analyzed.    3.) Labs and/or tests ordered include:  None.      4.) Health maintenance issues addressed today include annual health maintenance and non-gynecologic issues with PCP.    NATHAN Sneed, WHNP-BC, ANP-BC  Women's Health Nurse Practitioner  Adult Nurse Practitioner  Division of Gynecologic Oncology          CC  Patient Care Team:  Debra Wolfe NP as PCP - General (Nurse Practitioner)  Dannie Barber MD as Assigned Musculoskeletal Provider  Adrianna Hendricks APRN CNP as Assigned Cancer Care Provider

## 2021-07-05 ENCOUNTER — ONCOLOGY VISIT (OUTPATIENT)
Dept: ONCOLOGY | Facility: CLINIC | Age: 76
End: 2021-07-05
Attending: NURSE PRACTITIONER
Payer: MEDICARE

## 2021-07-05 VITALS
DIASTOLIC BLOOD PRESSURE: 87 MMHG | WEIGHT: 258.3 LBS | HEART RATE: 97 BPM | RESPIRATION RATE: 18 BRPM | SYSTOLIC BLOOD PRESSURE: 120 MMHG | HEIGHT: 64 IN | TEMPERATURE: 98 F | OXYGEN SATURATION: 95 % | BODY MASS INDEX: 44.1 KG/M2

## 2021-07-05 DIAGNOSIS — C54.1 ENDOMETRIAL CANCER (H): Primary | ICD-10-CM

## 2021-07-05 PROCEDURE — 99213 OFFICE O/P EST LOW 20 MIN: CPT | Performed by: NURSE PRACTITIONER

## 2021-07-05 PROCEDURE — G0463 HOSPITAL OUTPT CLINIC VISIT: HCPCS

## 2021-07-05 ASSESSMENT — MIFFLIN-ST. JEOR: SCORE: 1643.13

## 2021-07-05 ASSESSMENT — PAIN SCALES - GENERAL: PAINLEVEL: NO PAIN (0)

## 2021-07-05 NOTE — NURSING NOTE
"Oncology Rooming Note    July 5, 2021 2:33 PM   Yashira Son is a 75 year old female who presents for:    Chief Complaint   Patient presents with     Oncology Clinic Visit     Endometrial cancer      Initial Vitals: /87 (BP Location: Right arm, Patient Position: Sitting, Cuff Size: Adult Large)   Pulse 97   Temp 98  F (36.7  C) (Oral)   Resp 18   Ht 1.628 m (5' 4.09\")   Wt 117.2 kg (258 lb 4.8 oz)   SpO2 95%   BMI 44.21 kg/m   Estimated body mass index is 44.21 kg/m  as calculated from the following:    Height as of this encounter: 1.628 m (5' 4.09\").    Weight as of this encounter: 117.2 kg (258 lb 4.8 oz). Body surface area is 2.3 meters squared.  No Pain (0) Comment: Data Unavailable   No LMP recorded. Patient has had a hysterectomy.  Allergies reviewed: Yes  Medications reviewed: Yes    Medications: Medication refills not needed today.  Pharmacy name entered into [employer]: [facility] #[id number] - [address] AT [address] [place] [address]    Clinical concerns: New concerns: fatigue with activity, next steps for breast radiation.        Jaci Aguilera LPN July 5, 2021 2:34 PM                "

## 2021-07-05 NOTE — LETTER
2021         RE: Yashira Son  [address]  [address]  [facility] [address]        Dear Colleague,    Thank you for referring your patient, Yashira Son, to the [facility]. Please see a copy of my visit note below.                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 She completed chemotherapy 2016.   She is here today for a surveillance visit.      Oncology history:     Ms. Son originally presented with post-menopausal bleeding in 2016 with atypical glandular cell pap in 2015. Biopsy revealed adenocarcinoma and the patient proceeded with subsequent robotic hysterectomy, BSO and cancer staging on 12/18/15. No malignancy seen at time of procedure and peritoneal surfaces and omentum were clear. Final pathology report showed positive washings, a 2.1 cm noninvasive high-grade serous endometrial cancer, and benign polyps. + fibroids. DNA mismatch repair intact showing this is not due to hoyos. ER receptor was 66% positive and HI receptor was 38% positive --> stage IA high-grade serous endometrial cancer with positive washings. Patient underwent 6 cycles of chemotherapy, completed 16. Dose reduction of Taxol done during cycle #4 due to neuropathy of fingers and toes. CT scan was negative on 16. Other notable history includes: atrial fibrillation on coumadin, morbid obesity, adult-onset diabetes, asthma and hyperlipidemia.     12/18/15: [facility]/BSO/bilateral PPALND/omental biopsy with Dr. Brunner for high grade serous endometrial cancer.      PATH REREAD:A. UTERUS, FALLOPIAN TUBES AND OVARIES, HYSTERECTOMY AND BILATERAL   SALPINGO-OOPHORECTOMY:   - Endometrial serous carcinoma   - Maximum tumor size 2.1cm   - Myometrial invasion absent   - Lower uterine segment, cervix  "and uterine serosa - uninvolved   - Lymphovascular invasion - absent   - Margins negative for tumor   - Ovary and fallopian tubes negative for carcinoma.   - Leiomyomas     B. LYMPH NODES, RIGHT PERIAORTIC, DISSECTION:   - Two lymph nodes negative for carcinoma (0/2)     C. LYMPH NODE, LEFT PERIAORTIC, DISSECTION:   - One lymph nodes negative for carcinoma (0/1)     D. LYMPH NODES, LEFT PELVIC, DISSECTION:   - Two lymph nodes negative for carcinoma (0/2)     E. LYMPH NODES, RIGHT PARA-AORTIC DISSECTION:   - Nine lymph nodes negative for carcinoma (0/9)     F. OMENTUM, OMENTECTOMY:   - Adipose tissue negative for carcinoma     COMMENT:   Tumor cells are focally positive for estrogen receptor (5%) and negative for progesterone receptor. DNA mismatch repair enzymes are intact. Immunostains performed in the outside institution are reviewed in-house.      Paclitaxel/Carboplatin x 6 cycles completed 5/12/16 at outside facility.       CT C/A/P at OSH (6/2016): negative for masses except stable left lung nodule      She was lost to follow up until 8/2017.        Today she comes to clinic and denies any concerns for her visit. She denies any vaginal bleeding, no changes in her bowel or bladder habits, no nausea/emesis, no lower extremity edema, and no difficulties eating or sleeping. She denies any abdominal discomfort/bloating, no fevers or chills, and no chest pain or shortness of breath. She is going to be starting radiation for her breast cancer and is concerned about that. She is current with her health screenings. She is not sexually active. She monitors her BG at home and reports readings of 110-130. 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           Review of Systems:    Systemic           no weight changes; no fever; no chills; no night sweats; no appetite changes  Skin           no rashes, or lesions  Eye           no " irritation; no changes in vision  [place]-Laryngeal           no dysphagia; no hoarseness   Pulmonary    no cough; no shortness of breath  Cardiovascular    no chest pain; no palpitations  Gastrointestinal    no diarrhea; no constipation; no abdominal pain; no changes in bowel habits; no blood in stool  Genitourinary   no urinary frequency; no urinary urgency; no dysuria; no pain; no abnormal vaginal discharge; no abnormal vaginal bleeding  Breast    no breast discharge; no breast changes; no breast pain  Musculoskeletal    no myalgias; no arthralgias; no back pain  Psychiatric           no depressed mood; no anxiety    Hematologic              no tender lymph nodes; no noticeable swellings or lumps   Endocrine    no hot flashes; no heat/cold intolerance; + BG         Neurological   no tremor; no numbness and tingling; no headaches; no difficulty sleeping      Past Medical History:    Past Medical History:   Diagnosis Date     Anxiety      Asthma      Atrial fibrillation (H)      Depression      Endometrial cancer (H)      Hyperlipidemia      RONNY (obstructive sleep apnea)      Prediabetes          Past Surgical History:    Past Surgical History:   Procedure Laterality Date     DAVINCI HYSTERECTOMY TOTAL, BILATERAL SALPINGO-OOPHORECTOMY, NODE DISSECTION, COMBINED       oophorectomy Right     XL, dermoid, in 1981     THYROIDECTOMY      subtotal         Health Maintenance Due   Topic Date Due     A1C  Never done     BMP  Never done     DEXA  Never done     LIPID  Never done     MICROALBUMIN  Never done     DIABETIC FOOT EXAM  Never done     ADVANCE CARE PLANNING  Never done     EYE EXAM  Never done     HEPATITIS C SCREENING  Never done     ZOSTER IMMUNIZATION (1 of 2) Never done     MEDICARE ANNUAL WELLNESS VISIT  Never done     FALL RISK ASSESSMENT  10/04/2020     PHQ-2  01/01/2021       Current Medications:     Current Outpatient Medications   Medication Sig Dispense Refill     ACCU-CHEK SHIRLEY PLUS test strip   1      alendronate (FOSAMAX) 35 MG tablet TK 1 T PO Q WEEK  2     alendronate (FOSAMAX) 70 MG tablet Take 70 mg by mouth once a week       atorvastatin (LIPITOR) 20 MG tablet Take 20 mg by mouth daily        B Complex-Biotin-FA (B-50 COMPLEX PO) Take by mouth daily        BD AMOS U/F 32G X 4 MM insulin pen needle USE UTD WITH VICTOZA PEN  2     blood glucose monitoring (ACCU-CHEK SHIRLEY PLUS) meter device kit USE UTD  0     blood glucose monitoring (SOFTCLIX) lancets TK UTD  11     buPROPion (WELLBUTRIN XL) 150 MG 24 hr tablet TK 1 T PO QAM FOR DEPRESSION  3     calcium-magnesium (CALMAG) 500-250 MG TABS per tablet Take 1 tablet by mouth 2 times daily (with meals)       cholecalciferol (VITAMIN D3) 5000 UNITS TABS tablet Take 5,000 Units by mouth daily       DEXILANT 60 MG CPDR CR capsule TK ONE C PO QAM FOR GASTRIC REFLUX  1     dexlansoprazole (DEXILANT) 30 MG CPDR CR capsule Take 60 mg by mouth daily        escitalopram (LEXAPRO) 20 MG tablet Take 20 mg by mouth daily        exemestane (AROMASIN) 25 MG tablet 25 mg daily        fluticasone-salmeterol (ADVAIR DISKUS) 250-50 MCG/DOSE diskus inhaler Inhale 1 puff into the lungs 2 times daily        furosemide (LASIX) 20 MG tablet TK 2 TS PO D IF WEIGHT UP BY 3 POUNDS FROM BASELINE AND HAVE SWELLING IN LOWER EXTREMITIES. OTHERWISE TK 1 T D.  1     ibuprofen (ADVIL/MOTRIN) 800 MG tablet Take 800 mg by mouth 3 times daily as needed        lisinopril (PRINIVIL/ZESTRIL) 2.5 MG tablet TK 1 T PO QAM FOR KIDNEY PROTECTION  4     metFORMIN (GLUCOPHAGE) 500 MG tablet Take 500 mg by mouth 2 times daily (with meals)        metFORMIN (GLUCOPHAGE-XR) 500 MG 24 hr tablet TK 3 TS PO D WF  5     metoprolol (LOPRESSOR) 25 MG tablet Take 25 mg by mouth 2 times daily        montelukast (SINGULAIR) 10 MG tablet Take 10 mg by mouth At Bedtime       ondansetron (ZOFRAN) 4 MG tablet TK 1 T PO Q 8 H PRF NAUSEA  5     oxybutynin (DITROPAN-XL) 10 MG 24 hr tablet Take 10 mg by mouth daily         "PROCHLORPERAZINE MALEATE PO Take 10 mg by mouth every 6 hours as needed for nausea or vomiting       tiZANidine (ZANAFLEX) 2 MG tablet   2     TiZANidine HCl 2 MG CAPS Take 2 mg by mouth every 6 hours as needed        VENTOLIN  (90 Base) MCG/ACT inhaler INHALE 2 PUFFS Q 4 H PRF WHEEZING  3     VICTOZA PEN 18 MG/3ML soln INJ 0.6 MG ONCE D FOR 2 WEEKS THEN 1.2 MG ONCE D FOR 2 WEEKS THEN 1.8 MG ONCE D  1     warfarin (COUMADIN) 5 MG tablet Take 5 mg by mouth daily 2.5 mg (1/2 tablet) Tu-Su, whole tablet on Monday.       zolpidem (AMBIEN) 5 MG tablet Take 5 mg by mouth nightly as needed            Allergies:      No Known Allergies     Social History:     Social History     Tobacco Use     Smoking status: Former Smoker     Smokeless tobacco: Never Used     Tobacco comment: quite 40+ years ago    Substance Use Topics     Alcohol use: No     Comment: socially       History   Drug Use No         Family History:     The patient's family history is notable for:    Family History   Adopted: Yes         Physical Exam:     /87 (BP Location: Right arm, Patient Position: Sitting, Cuff Size: Adult Large)   Pulse 97   Temp 98  F (36.7  C) (Oral)   Resp 18   Ht 1.628 m (5' 4.09\")   Wt 117.2 kg (258 lb 4.8 oz)   SpO2 95%   BMI 44.21 kg/m    Body mass index is 44.21 kg/m .    General Appearance: healthy and alert, no distress     HEENT: no thyromegaly, no palpable nodules or masses        Cardiovascular: regular rate and rhythm, no gallops, rubs or murmurs     Respiratory: lungs clear, no rales, rhonchi or wheezes, normal diaphragmatic excursion    Musculoskeletal: extremities non tender and without edema    Skin: no lesions or rashes     Neurological: normal gait, no gross defects     Psychiatric: appropriate mood and affect                               Hematological: normal cervical, supraclavicular and inguinal lymph nodes     Gastrointestinal:       abdomen soft, non-tender, non-distended, no organomegaly or " masses    Genitourinary: External genitalia and urethral meatus appears normal.  Vagina is smooth without nodularity or masses.  Cervix surgically absent.  Bimanual exam reveal no masses, nodularity or fullness.  Recto-vaginal exam confirms these findings.      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 She completed chemotherapy 5/2016.   She is here today for a surveillance visit.     21 minutes spent on the date of the encounter doing chart review, history and exam, documentation and further activities as noted above      Plan:     1.)        She is now 5 years out from treatment completion and can extend her surveillance to annually; this can be done here or with her local provider; she will call here to schedule an appointment if one is needed. Reviewed recommendations from [facility] not to perform surveillance pap smears in women diagnosed with endometrial cancer as this does not improve detection of local recurrence. Reviewed signs and symptoms for when she should contact the clinic or seek additional care.   Patient to contact the clinic with any questions or concerns in the interim.  Answered all of her questions to the best of my ability.     2.) Genetic risk factors were assessed and her MMR proteins were intact/normal. She had a complete Multi Cancer panel of 84 genes via [facility] and she was negative. No clinically-actionable (pathogenic) mutations or other variants were detected in the genes analyzed.    3.) Labs and/or tests ordered include:  None.      4.) Health maintenance issues addressed today include annual health maintenance and non-gynecologic issues with PCP.    NATHAN Sneed, WHNP-BC, ANP-BC  Women's Health Nurse Practitioner  Adult Nurse Practitioner  Division of Gynecologic Oncology        CC  Patient Care Team:  Debra Wolfe NP as PCP - General (Nurse Practitioner)  Dannie Barber MD as Assigned  Musculoskeletal Provider  Adrianna Hendricks APRN CNP as Assigned Cancer Care Provider

## 2021-08-18 NOTE — NURSING NOTE
"Oncology Rooming Note    August 28, 2018 1:36 PM   Yashira Son is a 73 year old female who presents for:    Chief Complaint   Patient presents with     Oncology Clinic Visit     return endometrial ca     Initial Vitals: /82  Pulse 103  Temp 98.6  F (37  C)  Ht 1.651 m (5' 5\")  Wt 122.7 kg (270 lb 6.4 oz)  SpO2 100%  BMI 45 kg/m2 Estimated body mass index is 45 kg/(m^2) as calculated from the following:    Height as of this encounter: 1.651 m (5' 5\").    Weight as of this encounter: 122.7 kg (270 lb 6.4 oz). Body surface area is 2.37 meters squared.  No Pain (0) Comment: Data Unavailable   No LMP recorded. Patient has had a hysterectomy.  Allergies reviewed: Yes  Medications reviewed: Yes    Medications: Medication refills not needed today.  Pharmacy name entered into [employer]: [facility] [address] [place] [address] AT [address] [place] [address]    Clinical concerns: none     6 minutes for nursing intake (face to face time)     Aziza Nathan RN              " Problem: Falls - Risk of:  Goal: Will remain free from falls  Description: Will remain free from falls  Outcome: Met This Shift     Problem: Falls - Risk of:  Goal: Absence of physical injury  Description: Absence of physical injury  Outcome: Met This Shift     Problem: Pain:  Goal: Pain level will decrease  Description: Pain level will decrease  8/18/2021 1146 by Angel Vera RN  Outcome: Met This Shift  8/18/2021 0248 by Ellie Courtney RN  Outcome: Met This Shift     Problem: Pain - Acute:  Goal: Pain level will decrease  Description: Pain level will decrease  8/18/2021 1146 by Angel Vera RN  Outcome: Met This Shift  8/18/2021 0248 by Ellie Courtney RN  Outcome: Met This Shift

## 2024-07-11 ENCOUNTER — TELEPHONE (OUTPATIENT)
Dept: CARDIOLOGY | Facility: CLINIC | Age: 79
End: 2024-07-11
Payer: MEDICARE

## 2024-07-11 ENCOUNTER — TRANSCRIBE ORDERS (OUTPATIENT)
Dept: OTHER | Age: 79
End: 2024-07-11

## 2024-07-11 DIAGNOSIS — I48.20 CHRONIC ATRIAL FIBRILLATION (H): Primary | ICD-10-CM

## 2024-07-11 NOTE — TELEPHONE ENCOUNTER
7/11/2024 4:21PM Benita Jeff  Patient confirmed scheduled appointment:  Date: 7/11/2024  Time: 9:30AM  Visit type: New Cardiology  Provider: NATHAN La CNP  Location: [address] &[place] [address]  Testing/imaging: NA  Additional notes: 7/11 Scheduled New Cardiology w/ Sherine Irvin 7/15 for cardiac clearance. GEOVANNI Jeff 7/11/2024 4:21PM

## 2024-07-11 NOTE — TELEPHONE ENCOUNTER
[facility] Call Center    Phone Message    May a detailed message be left on voicemail: yes     Reason for Call: Appointment Intake    Referring Provider Name: SARINAJEF NATASHA affiliated with [facility]     Diagnosis and/or Symptoms: pre-op clearance for right breast mastectomy scheduled on 7/18/24, pt has a fib       Unable to schedule pt within timeframe requested.  Emergent referral.  Please review and call pt to schedule.       Action Taken: Message routed to:  [facility] cardio    Travel Screening: Not Applicable     Date of Service:

## 2024-07-12 NOTE — PROGRESS NOTES
[facility] - [facility]   Cardiology Clinic Note      HPI:   Ms. Yashira Son is a pleasant 78 year old female with medical history pertinent for chornic atrial fibrillation, right breast cancer, and endometrial cancer s/p chemotherapy (2016). She presents to cardiology clinic for pre-operative evaluation.  Yashira will be undergoing right mastectomy on 7/18 at [facility]. She denies chest pain, palpitations, dizziness, syncope, or lower extremity edema. She doesn't feel her AF, but remarks that in recent years her exercise tolerance has decreased and she is fatigued more often. She isn't sure if that's related to recent chemo & radiation or her chronic AF. She has been watching videos online of cardioversions and is curious if this would help her symptoms.       PAST MEDICAL HISTORY:  Past Medical History:   Diagnosis Date    Anxiety     Asthma     Atrial fibrillation (H)     Depression     Endometrial cancer (H)     Hyperlipidemia     RONNY (obstructive sleep apnea)     Prediabetes        FAMILY HISTORY:  Family History   Adopted: Yes       SOCIAL HISTORY:  Social History     Socioeconomic History    Marital status:    Tobacco Use    Smoking status: Former    Smokeless tobacco: Never    Tobacco comments:     quite 40+ years ago    Substance and Sexual Activity    Alcohol use: No     Comment: socially    Drug use: No    Sexual activity: Not Currently     Social Determinants of Health      Received from [employer]    Financial Resource Strain    Received from [employer]    Social Connections       CURRENT MEDICATIONS:  Current Outpatient Medications   Medication Sig Dispense Refill    atorvastatin (LIPITOR) 20 MG tablet Take 20 mg by mouth daily       DEXILANT 60 MG CPDR CR capsule TK ONE C PO QAM FOR GASTRIC REFLUX  1    dexlansoprazole (DEXILANT) 30 MG CPDR CR capsule Take 60 mg by mouth daily       escitalopram (LEXAPRO) 20 MG tablet Take  20 mg by mouth daily       ibuprofen (ADVIL/MOTRIN) 800 MG tablet Take 800 mg by mouth 3 times daily as needed       lisinopril (PRINIVIL/ZESTRIL) 2.5 MG tablet TK 1 T PO QAM FOR KIDNEY PROTECTION  4    metoprolol (LOPRESSOR) 25 MG tablet Take 25 mg by mouth 2 times daily       montelukast (SINGULAIR) 10 MG tablet Take 10 mg by mouth At Bedtime      Multiple Vitamin (MULTIVITAMIN ADULT PO)       ondansetron (ZOFRAN) 4 MG tablet TK 1 T PO Q 8 H PRF NAUSEA  5    oxybutynin (DITROPAN-XL) 10 MG 24 hr tablet Take 10 mg by mouth daily       pilocarpine (SALAGEN) 5 MG tablet Take 1 tablet by mouth 3 times daily      tiZANidine (ZANAFLEX) 2 MG tablet   2    TiZANidine HCl 2 MG CAPS Take 2 mg by mouth every 6 hours as needed       warfarin (COUMADIN) 5 MG tablet Take 5 mg by mouth daily 2.5 mg (1/2 tablet) Tu-Su, whole tablet on Monday.      ACCU-CHEK SHIRLEY PLUS test strip  (Patient not taking: Reported on 7/15/2024)  1    alendronate (FOSAMAX) 35 MG tablet TK 1 T PO Q WEEK (Patient not taking: Reported on 7/15/2024)  2    alendronate (FOSAMAX) 70 MG tablet Take 70 mg by mouth once a week (Patient not taking: Reported on 7/15/2024)      B Complex-Biotin-FA (B-50 COMPLEX PO) Take by mouth daily  (Patient not taking: Reported on 7/15/2024)      BD AMOS U/F 32G X 4 MM insulin pen needle USE UTD WITH VICTOZA PEN (Patient not taking: Reported on 7/15/2024)  2    blood glucose monitoring (ACCU-CHEK SHIRLEY PLUS) meter device kit USE UTD (Patient not taking: Reported on 7/15/2024)  0    blood glucose monitoring (SOFTCLIX) lancets TK UTD (Patient not taking: Reported on 7/15/2024)  11    buPROPion (WELLBUTRIN XL) 150 MG 24 hr tablet TK 1 T PO QAM FOR DEPRESSION (Patient not taking: Reported on 7/15/2024)  3    calcium-magnesium (CALMAG) 500-250 MG TABS per tablet Take 1 tablet by mouth 2 times daily (with meals) (Patient not taking: Reported on 7/15/2024)      cholecalciferol (VITAMIN D3) 5000 UNITS TABS tablet Take 5,000 Units by  "mouth daily (Patient not taking: Reported on 7/15/2024)      exemestane (AROMASIN) 25 MG tablet 25 mg daily  (Patient not taking: Reported on 7/15/2024)      fluticasone-salmeterol (ADVAIR DISKUS) 250-50 MCG/DOSE diskus inhaler Inhale 1 puff into the lungs 2 times daily  (Patient not taking: Reported on 7/15/2024)      furosemide (LASIX) 20 MG tablet TK 2 TS PO D IF WEIGHT UP BY 3 POUNDS FROM BASELINE AND HAVE SWELLING IN LOWER EXTREMITIES. OTHERWISE TK 1 T D. (Patient not taking: Reported on 7/15/2024)  1    metFORMIN (GLUCOPHAGE) 500 MG tablet Take 500 mg by mouth 2 times daily (with meals)  (Patient not taking: Reported on 7/15/2024)      metFORMIN (GLUCOPHAGE-XR) 500 MG 24 hr tablet TK 3 TS PO D WF (Patient not taking: Reported on 7/15/2024)  5    PROCHLORPERAZINE MALEATE PO Take 10 mg by mouth every 6 hours as needed for nausea or vomiting (Patient not taking: Reported on 7/15/2024)      VENTOLIN  (90 Base) MCG/ACT inhaler INHALE 2 PUFFS Q 4 H PRF WHEEZING (Patient not taking: Reported on 7/15/2024)  3    VICTOZA PEN 18 MG/3ML soln INJ 0.6 MG ONCE D FOR 2 WEEKS THEN 1.2 MG ONCE D FOR 2 WEEKS THEN 1.8 MG ONCE D (Patient not taking: Reported on 7/15/2024)  1    zolpidem (AMBIEN) 5 MG tablet Take 5 mg by mouth nightly as needed  (Patient not taking: Reported on 7/15/2024)       Current Facility-Administered Medications   Medication Dose Route Frequency Provider Last Rate Last Admin    lidocaine (PF) (XYLOCAINE) 1 % injection 4 mL  4 mL   Dannie Barber MD   4 mL at 09/23/20 1219    triamcinolone (KENALOG-40) injection 40 mg  40 mg   Dannie Barber MD   40 mg at 09/23/20 1219       ROS:   Refer to HPI    EXAM:  /81 (BP Location: Right arm, Patient Position: Chair, Cuff Size: Adult Large)   Pulse 79   Ht 1.632 m (5' 4.25\")   Wt 105.2 kg (231 lb 14.4 oz)   SpO2 95%   BMI 39.49 kg/m    GENERAL: Appears comfortable, in no acute distress.   HEENT: Eye symmetrical, no discharge or " "icterus bilaterally. Mucous membranes moist and without lesions.  CV: irregularly irregular, +S1S2, no murmur, rub, or gallop.   RESPIRATORY: Respirations regular, even, and unlabored. Lungs CTA throughout.   GI: Soft and non distended with normoactive bowel sounds present in all quadrants. No tenderness, rebound, guarding.   EXTREMITIES: no peripheral edema. 2+ bilateral pedal pulses.   NEUROLOGIC: Alert and oriented x 3. No focal deficits.   MUSCULOSKELETAL: No joint swelling or tenderness.   SKIN: No jaundice. No rashes or lesions.     Labs, reviewed with patient in clinic today:  CBC RESULTS:  No results found for: \"WBC\", \"RBC\", \"HGB\", \"HCT\", \"MCV\", \"MCH\", \"MCHC\", \"RDW\", \"PLT\"    CMP RESULTS:  Lab Results   Component Value Date    GFRESTIMATED 54 (L) 08/15/2017    GFRESTBLACK 66 08/15/2017        INR RESULTS:  No results found for: \"INR\"    No results found for: \"MAG\"  No results found for: \"NTBNPI\"  No results found for: \"NTBNP\"      EKG 07/15/24      Echocardiogram 2012:  Final Impression:   1. Stress echo negative for ischemia at adequate workload.   2. Subjectively negative for anginal symptoms.   3. Very poor exercise tolerance, possibly due to atrial fibrillation with   rapid ventricular response which occurs during stress.     Interpretation:   The patient was stressed according to the standard Agustín protocol and   exercised for 3:40seconds, stopped due to fatigue. Resting heart rate of   58bpm increased to 190bpm. She was in sinus rhythm at baseline and   converted to atrial fibrillation in stage I. There was no ischemic change.   She continued to exercise until 3:41minutes and did not have chest pain.   EKG in recovery showed persistent atrial fibrillation. Resting blood   pressure of 132/85mmHg increased to 142mmHg. The double product was   26,980.     Resting echo shows normal left ventricular function with ejection fraction   of 60%, no baseline regional wall motion abnormality.     Imaging post " stress shows appropriate augmentation in all wall segments   with a peak ejection fraction of 85%. There are no stress-induced regional   wall motion abnormalities.     Screening color flow Doppler shows mild mitral regurgitation, trace   tricuspid regurgitation.     Assessment and Plan:   Ms. Son is a 78 year old female with a PMH of chronic atrial fibrillation, right breast cancer, and endometrial cancer s/p chemotherapy (2016).    # Chronic atrial fibrillation   [id number] = 3 (age++, female+)  BB: metoprolol tartrate 25mg BID  AC: warfarin  - EP referral     # Right breast cancer  # Pre-operative evaluation  Planned right mastectomy for 7/18. RCRI = 1 point, corresponding to a 6.% 30-day risk of cardiac morbidity or mortality.   - no contraindications to surgery from cardiology    Follow up: as needed   Chart review time today: 10 minutes  Visit time today: 12 minutes  Total time spent today: 22 minutes        NATHAN IVAN CNP  General Cardiology   07/15/24

## 2024-07-15 ENCOUNTER — OFFICE VISIT (OUTPATIENT)
Dept: CARDIOLOGY | Facility: CLINIC | Age: 79
End: 2024-07-15
Attending: CASE MANAGER/CARE COORDINATOR
Payer: MEDICARE

## 2024-07-15 VITALS
SYSTOLIC BLOOD PRESSURE: 111 MMHG | BODY MASS INDEX: 39.59 KG/M2 | WEIGHT: 231.9 LBS | HEART RATE: 79 BPM | HEIGHT: 64 IN | OXYGEN SATURATION: 95 % | DIASTOLIC BLOOD PRESSURE: 81 MMHG

## 2024-07-15 DIAGNOSIS — I48.20 CHRONIC ATRIAL FIBRILLATION (H): ICD-10-CM

## 2024-07-15 DIAGNOSIS — D05.11 DUCTAL CARCINOMA IN SITU (DCIS) OF RIGHT BREAST: ICD-10-CM

## 2024-07-15 DIAGNOSIS — Z01.818 PRE-OP EVALUATION: Primary | ICD-10-CM

## 2024-07-15 PROCEDURE — 93010 ELECTROCARDIOGRAM REPORT: CPT | Performed by: INTERNAL MEDICINE

## 2024-07-15 PROCEDURE — 93005 ELECTROCARDIOGRAM TRACING: CPT

## 2024-07-15 PROCEDURE — G0463 HOSPITAL OUTPT CLINIC VISIT: HCPCS | Performed by: CASE MANAGER/CARE COORDINATOR

## 2024-07-15 PROCEDURE — 99202 OFFICE O/P NEW SF 15 MIN: CPT | Performed by: CASE MANAGER/CARE COORDINATOR

## 2024-07-15 RX ORDER — PILOCARPINE HYDROCHLORIDE 5 MG/1
1 TABLET, FILM COATED ORAL
COMMUNITY
Start: 2024-06-15

## 2024-07-15 ASSESSMENT — PAIN SCALES - GENERAL: PAINLEVEL: NO PAIN (0)

## 2024-07-15 NOTE — LETTER
7/15/2024      RE: Yashira Son  [address]  [place] [address]       Dear Colleague,    Thank you for the opportunity to participate in the care of your patient, Yashira Son, at the [facility] [place] [facility]. Please see a copy of my visit note below.      [facility] - [facility]   Cardiology Clinic Note      HPI:   Ms. Yashira Son is a pleasant 78 year old female with medical history pertinent for chornic atrial fibrillation, right breast cancer, and endometrial cancer s/p chemotherapy (2016). She presents to cardiology clinic for pre-operative evaluation.  Yashira will be undergoing right mastectomy on 7/18 at [facility]. She denies chest pain, palpitations, dizziness, syncope, or lower extremity edema. She doesn't feel her AF, but remarks that in recent years her exercise tolerance has decreased and she is fatigued more often. She isn't sure if that's related to recent chemo & radiation or her chronic AF. She has been watching videos online of cardioversions and is curious if this would help her symptoms.       PAST MEDICAL HISTORY:  Past Medical History:   Diagnosis Date     Anxiety      Asthma      Atrial fibrillation (H)      Depression      Endometrial cancer (H)      Hyperlipidemia      RONNY (obstructive sleep apnea)      Prediabetes        FAMILY HISTORY:  Family History   Adopted: Yes       SOCIAL HISTORY:  Social History     Socioeconomic History     Marital status:    Tobacco Use     Smoking status: Former     Smokeless tobacco: Never     Tobacco comments:     quite 40+ years ago    Substance and Sexual Activity     Alcohol use: No     Comment: socially     Drug use: No     Sexual activity: Not Currently     Social Determinants of Health      [facility]    Financial Resource Strain    Received from [employer]    Social  Connections       CURRENT MEDICATIONS:  Current Outpatient Medications   Medication Sig Dispense Refill     atorvastatin (LIPITOR) 20 MG tablet Take 20 mg by mouth daily        DEXILANT 60 MG CPDR CR capsule TK ONE C PO QAM FOR GASTRIC REFLUX  1     dexlansoprazole (DEXILANT) 30 MG CPDR CR capsule Take 60 mg by mouth daily        escitalopram (LEXAPRO) 20 MG tablet Take 20 mg by mouth daily        ibuprofen (ADVIL/MOTRIN) 800 MG tablet Take 800 mg by mouth 3 times daily as needed        lisinopril (PRINIVIL/ZESTRIL) 2.5 MG tablet TK 1 T PO QAM FOR KIDNEY PROTECTION  4     metoprolol (LOPRESSOR) 25 MG tablet Take 25 mg by mouth 2 times daily        montelukast (SINGULAIR) 10 MG tablet Take 10 mg by mouth At Bedtime       Multiple Vitamin (MULTIVITAMIN ADULT PO)        ondansetron (ZOFRAN) 4 MG tablet TK 1 T PO Q 8 H PRF NAUSEA  5     oxybutynin (DITROPAN-XL) 10 MG 24 hr tablet Take 10 mg by mouth daily        pilocarpine (SALAGEN) 5 MG tablet Take 1 tablet by mouth 3 times daily       tiZANidine (ZANAFLEX) 2 MG tablet   2     TiZANidine HCl 2 MG CAPS Take 2 mg by mouth every 6 hours as needed        warfarin (COUMADIN) 5 MG tablet Take 5 mg by mouth daily 2.5 mg (1/2 tablet) Tu-Su, whole tablet on Monday.       ACCU-CHEK SHIRLEY PLUS test strip  (Patient not taking: Reported on 7/15/2024)  1     alendronate (FOSAMAX) 35 MG tablet TK 1 T PO Q WEEK (Patient not taking: Reported on 7/15/2024)  2     alendronate (FOSAMAX) 70 MG tablet Take 70 mg by mouth once a week (Patient not taking: Reported on 7/15/2024)       B Complex-Biotin-FA (B-50 COMPLEX PO) Take by mouth daily  (Patient not taking: Reported on 7/15/2024)       BD AMOS U/F 32G X 4 MM insulin pen needle USE UTD WITH VICTOZA PEN (Patient not taking: Reported on 7/15/2024)  2     blood glucose monitoring (ACCU-CHEK SHIRLEY PLUS) meter device kit USE UTD (Patient not taking: Reported on 7/15/2024)  0     blood glucose monitoring (SOFTCLIX) lancets TK UTD (Patient not  taking: Reported on 7/15/2024)  11     buPROPion (WELLBUTRIN XL) 150 MG 24 hr tablet TK 1 T PO QAM FOR DEPRESSION (Patient not taking: Reported on 7/15/2024)  3     calcium-magnesium (CALMAG) 500-250 MG TABS per tablet Take 1 tablet by mouth 2 times daily (with meals) (Patient not taking: Reported on 7/15/2024)       cholecalciferol (VITAMIN D3) 5000 UNITS TABS tablet Take 5,000 Units by mouth daily (Patient not taking: Reported on 7/15/2024)       exemestane (AROMASIN) 25 MG tablet 25 mg daily  (Patient not taking: Reported on 7/15/2024)       fluticasone-salmeterol (ADVAIR DISKUS) 250-50 MCG/DOSE diskus inhaler Inhale 1 puff into the lungs 2 times daily  (Patient not taking: Reported on 7/15/2024)       furosemide (LASIX) 20 MG tablet TK 2 TS PO D IF WEIGHT UP BY 3 POUNDS FROM BASELINE AND HAVE SWELLING IN LOWER EXTREMITIES. OTHERWISE TK 1 T D. (Patient not taking: Reported on 7/15/2024)  1     metFORMIN (GLUCOPHAGE) 500 MG tablet Take 500 mg by mouth 2 times daily (with meals)  (Patient not taking: Reported on 7/15/2024)       metFORMIN (GLUCOPHAGE-XR) 500 MG 24 hr tablet TK 3 TS PO D WF (Patient not taking: Reported on 7/15/2024)  5     PROCHLORPERAZINE MALEATE PO Take 10 mg by mouth every 6 hours as needed for nausea or vomiting (Patient not taking: Reported on 7/15/2024)       VENTOLIN  (90 Base) MCG/ACT inhaler INHALE 2 PUFFS Q 4 H PRF WHEEZING (Patient not taking: Reported on 7/15/2024)  3     VICTOZA PEN 18 MG/3ML soln INJ 0.6 MG ONCE D FOR 2 WEEKS THEN 1.2 MG ONCE D FOR 2 WEEKS THEN 1.8 MG ONCE D (Patient not taking: Reported on 7/15/2024)  1     zolpidem (AMBIEN) 5 MG tablet Take 5 mg by mouth nightly as needed  (Patient not taking: Reported on 7/15/2024)       Current Facility-Administered Medications   Medication Dose Route Frequency Provider Last Rate Last Admin     lidocaine (PF) (XYLOCAINE) 1 % injection 4 mL  4 mL   Dannie Barber MD   4 mL at 09/23/20 1219     triamcinolone  "(KENALOG-40) injection 40 mg  40 mg   Dannie Barber MD   40 mg at 09/23/20 1219       ROS:   Refer to HPI    EXAM:  /81 (BP Location: Right arm, Patient Position: Chair, Cuff Size: Adult Large)   Pulse 79   Ht 1.632 m (5' 4.25\")   Wt 105.2 kg (231 lb 14.4 oz)   SpO2 95%   BMI 39.49 kg/m    GENERAL: Appears comfortable, in no acute distress.   HEENT: Eye symmetrical, no discharge or icterus bilaterally. Mucous membranes moist and without lesions.  CV: irregularly irregular, +S1S2, no murmur, rub, or gallop.   RESPIRATORY: Respirations regular, even, and unlabored. Lungs CTA throughout.   GI: Soft and non distended with normoactive bowel sounds present in all quadrants. No tenderness, rebound, guarding.   EXTREMITIES: no peripheral edema. 2+ bilateral pedal pulses.   NEUROLOGIC: Alert and oriented x 3. No focal deficits.   MUSCULOSKELETAL: No joint swelling or tenderness.   SKIN: No jaundice. No rashes or lesions.     Labs, reviewed with patient in clinic today:  CBC RESULTS:  No results found for: \"WBC\", \"RBC\", \"HGB\", \"HCT\", \"MCV\", \"MCH\", \"MCHC\", \"RDW\", \"PLT\"    CMP RESULTS:  Lab Results   Component Value Date    GFRESTIMATED 54 (L) 08/15/2017    GFRESTBLACK 66 08/15/2017        INR RESULTS:  No results found for: \"INR\"    No results found for: \"MAG\"  No results found for: \"NTBNPI\"  No results found for: \"NTBNP\"      EKG 07/15/24      Echocardiogram 2012:  Final Impression:   1. Stress echo negative for ischemia at adequate workload.   2. Subjectively negative for anginal symptoms.   3. Very poor exercise tolerance, possibly due to atrial fibrillation with   rapid ventricular response which occurs during stress.     Interpretation:   The patient was stressed according to the standard Agustín protocol and   exercised for 3:40seconds, stopped due to fatigue. Resting heart rate of   58bpm increased to 190bpm. She was in sinus rhythm at baseline and   converted to atrial fibrillation in stage I. There " was no ischemic change.   She continued to exercise until 3:41minutes and did not have chest pain.   EKG in recovery showed persistent atrial fibrillation. Resting blood   pressure of 132/85mmHg increased to 142mmHg. The double product was   26,980.     Resting echo shows normal left ventricular function with ejection fraction   of 60%, no baseline regional wall motion abnormality.     Imaging post stress shows appropriate augmentation in all wall segments   with a peak ejection fraction of 85%. There are no stress-induced regional   wall motion abnormalities.     Screening color flow Doppler shows mild mitral regurgitation, trace   tricuspid regurgitation.     Assessment and Plan:   Ms. Son is a 78 year old female with a PMH of chronic atrial fibrillation, right breast cancer, and endometrial cancer s/p chemotherapy (2016).    # Chronic atrial fibrillation   [id number] = 3 (age++, female+)  BB: metoprolol tartrate 25mg BID  AC: warfarin  - EP referral     # Right breast cancer  # Pre-operative evaluation  Planned right mastectomy for 7/18. RCRI = 1 point, corresponding to a 6.% 30-day risk of cardiac morbidity or mortality.   - no contraindications to surgery from cardiology    Follow up: as needed   Chart review time today: 10 minutes  Visit time today: 12 minutes  Total time spent today: 22 minutes        NATHAN IVAN CNP  General Cardiology   07/15/24          Please do not hesitate to contact me if you have any questions/concerns.     Sincerely,     NATHAN IVAN CNP

## 2024-07-15 NOTE — NURSING NOTE
Chief Complaint   Patient presents with    New Patient     pre op clearance for masectom       Vitals were taken, medications reconciled and EKG performed.    Will Aldaan, Clinic Assistant   9:43 AM

## 2024-07-16 LAB
ATRIAL RATE - MUSE: 170 BPM
DIASTOLIC BLOOD PRESSURE - MUSE: NORMAL MMHG
INTERPRETATION ECG - MUSE: NORMAL
P AXIS - MUSE: NORMAL DEGREES
PR INTERVAL - MUSE: NORMAL MS
QRS DURATION - MUSE: 74 MS
QT - MUSE: 346 MS
QTC - MUSE: 434 MS
R AXIS - MUSE: -13 DEGREES
SYSTOLIC BLOOD PRESSURE - MUSE: NORMAL MMHG
T AXIS - MUSE: 32 DEGREES
VENTRICULAR RATE- MUSE: 95 BPM
